# Patient Record
Sex: FEMALE | Race: WHITE | NOT HISPANIC OR LATINO | Employment: FULL TIME | URBAN - METROPOLITAN AREA
[De-identification: names, ages, dates, MRNs, and addresses within clinical notes are randomized per-mention and may not be internally consistent; named-entity substitution may affect disease eponyms.]

---

## 2018-03-09 ENCOUNTER — OFFICE VISIT CONVERTED (OUTPATIENT)
Dept: FAMILY MEDICINE CLINIC | Facility: CLINIC | Age: 43
End: 2018-03-09
Attending: NURSE PRACTITIONER

## 2019-12-12 ENCOUNTER — OFFICE VISIT CONVERTED (OUTPATIENT)
Dept: FAMILY MEDICINE CLINIC | Facility: CLINIC | Age: 44
End: 2019-12-12
Attending: NURSE PRACTITIONER

## 2019-12-12 ENCOUNTER — HOSPITAL ENCOUNTER (OUTPATIENT)
Dept: LAB | Facility: HOSPITAL | Age: 44
Discharge: HOME | End: 2019-12-12
Attending: NURSE PRACTITIONER

## 2019-12-12 LAB
25(OH)D3 SERPL-MCNC: 24.3 NG/ML (ref 30–100)
ALBUMIN SERPL-MCNC: 4 G/DL (ref 3.5–5)
ALBUMIN/GLOB SERPL: 1.1 {RATIO} (ref 1.4–2.6)
ALP SERPL-CCNC: 86 U/L (ref 42–98)
ALT SERPL-CCNC: 14 U/L (ref 10–40)
ANION GAP SERPL CALC-SCNC: 16 MMOL/L (ref 8–19)
AST SERPL-CCNC: 14 U/L (ref 15–50)
BASOPHILS # BLD AUTO: 0.07 10*3/UL (ref 0–0.2)
BASOPHILS NFR BLD AUTO: 0.8 % (ref 0–3)
BILIRUB SERPL-MCNC: 0.32 MG/DL (ref 0.2–1.3)
BUN SERPL-MCNC: 10 MG/DL (ref 5–25)
BUN/CREAT SERPL: 12 {RATIO} (ref 6–20)
CALCIUM SERPL-MCNC: 9.3 MG/DL (ref 8.7–10.4)
CHLORIDE SERPL-SCNC: 101 MMOL/L (ref 99–111)
CHOLEST SERPL-MCNC: 163 MG/DL (ref 107–200)
CHOLEST/HDLC SERPL: 3.6 {RATIO} (ref 3–6)
CONV ABS IMM GRAN: 0.03 10*3/UL (ref 0–0.2)
CONV CO2: 23 MMOL/L (ref 22–32)
CONV IMMATURE GRAN: 0.3 % (ref 0–1.8)
CONV TOTAL PROTEIN: 7.6 G/DL (ref 6.3–8.2)
CREAT UR-MCNC: 0.81 MG/DL (ref 0.5–0.9)
DEPRECATED RDW RBC AUTO: 44.8 FL (ref 36.4–46.3)
EOSINOPHIL # BLD AUTO: 0.18 10*3/UL (ref 0–0.7)
EOSINOPHIL # BLD AUTO: 2 % (ref 0–7)
ERYTHROCYTE [DISTWIDTH] IN BLOOD BY AUTOMATED COUNT: 13.1 % (ref 11.7–14.4)
GFR SERPLBLD BASED ON 1.73 SQ M-ARVRAT: >60 ML/MIN/{1.73_M2}
GLOBULIN UR ELPH-MCNC: 3.6 G/DL (ref 2–3.5)
GLUCOSE SERPL-MCNC: 102 MG/DL (ref 65–99)
HCT VFR BLD AUTO: 42.2 % (ref 37–47)
HDLC SERPL-MCNC: 45 MG/DL (ref 40–60)
HGB BLD-MCNC: 13.2 G/DL (ref 12–16)
LDLC SERPL CALC-MCNC: 89 MG/DL (ref 70–100)
LYMPHOCYTES # BLD AUTO: 2.26 10*3/UL (ref 1–5)
LYMPHOCYTES NFR BLD AUTO: 24.8 % (ref 20–45)
MCH RBC QN AUTO: 29.4 PG (ref 27–31)
MCHC RBC AUTO-ENTMCNC: 31.3 G/DL (ref 33–37)
MCV RBC AUTO: 94 FL (ref 81–99)
MONOCYTES # BLD AUTO: 0.5 10*3/UL (ref 0.2–1.2)
MONOCYTES NFR BLD AUTO: 5.5 % (ref 3–10)
NEUTROPHILS # BLD AUTO: 6.09 10*3/UL (ref 2–8)
NEUTROPHILS NFR BLD AUTO: 66.6 % (ref 30–85)
NRBC CBCN: 0 % (ref 0–0.7)
OSMOLALITY SERPL CALC.SUM OF ELEC: 281 MOSM/KG (ref 273–304)
PLATELET # BLD AUTO: 296 10*3/UL (ref 130–400)
PMV BLD AUTO: 11 FL (ref 9.4–12.3)
POTASSIUM SERPL-SCNC: 4 MMOL/L (ref 3.5–5.3)
RBC # BLD AUTO: 4.49 10*6/UL (ref 4.2–5.4)
SODIUM SERPL-SCNC: 136 MMOL/L (ref 135–147)
T4 FREE SERPL-MCNC: 1.1 NG/DL (ref 0.9–1.8)
TRIGL SERPL-MCNC: 147 MG/DL (ref 40–150)
TSH SERPL-ACNC: 2.07 M[IU]/L (ref 0.27–4.2)
VLDLC SERPL-MCNC: 29 MG/DL (ref 5–37)
WBC # BLD AUTO: 9.13 10*3/UL (ref 4.8–10.8)

## 2020-01-17 ENCOUNTER — HOSPITAL ENCOUNTER (OUTPATIENT)
Dept: GENERAL RADIOLOGY | Facility: HOSPITAL | Age: 45
Discharge: HOME | End: 2020-01-17
Attending: NURSE PRACTITIONER

## 2020-10-27 ENCOUNTER — OFFICE VISIT CONVERTED (OUTPATIENT)
Dept: FAMILY MEDICINE CLINIC | Facility: CLINIC | Age: 45
End: 2020-10-27
Attending: NURSE PRACTITIONER

## 2020-10-27 ENCOUNTER — HOSPITAL ENCOUNTER (OUTPATIENT)
Dept: FAMILY MEDICINE CLINIC | Facility: CLINIC | Age: 45
Discharge: HOME | End: 2020-10-27
Attending: NURSE PRACTITIONER

## 2020-10-30 LAB
AMPICILLIN SUSC ISLT: >=32
AMPICILLIN+SULBAC SUSC ISLT: >=32
BACTERIA UR CULT: ABNORMAL
CEFAZOLIN SUSC ISLT: <=4
CEFEPIME SUSC ISLT: <=0.12
CEFTAZIDIME SUSC ISLT: <=1
CEFTRIAXONE SUSC ISLT: <=0.25
CIPROFLOXACIN SUSC ISLT: <=0.25
ERTAPENEM SUSC ISLT: <=0.12
GENTAMICIN SUSC ISLT: <=1
LEVOFLOXACIN SUSC ISLT: <=0.12
NITROFURANTOIN SUSC ISLT: <=16
PIP+TAZO SUSC ISLT: <=4
TMP SMX SUSC ISLT: >=320
TOBRAMYCIN SUSC ISLT: <=1

## 2020-12-14 ENCOUNTER — HOSPITAL ENCOUNTER (OUTPATIENT)
Dept: FAMILY MEDICINE CLINIC | Facility: CLINIC | Age: 45
Discharge: HOME | End: 2020-12-14
Attending: NURSE PRACTITIONER

## 2020-12-14 ENCOUNTER — HOSPITAL ENCOUNTER (OUTPATIENT)
Dept: LAB | Facility: HOSPITAL | Age: 45
Discharge: HOME | End: 2020-12-14
Attending: NURSE PRACTITIONER

## 2020-12-14 ENCOUNTER — OFFICE VISIT CONVERTED (OUTPATIENT)
Dept: FAMILY MEDICINE CLINIC | Facility: CLINIC | Age: 45
End: 2020-12-14
Attending: NURSE PRACTITIONER

## 2020-12-14 LAB
ALBUMIN SERPL-MCNC: 3.9 G/DL (ref 3.5–5)
ALBUMIN/GLOB SERPL: 1.1 {RATIO} (ref 1.4–2.6)
ALP SERPL-CCNC: 84 U/L (ref 42–98)
ALT SERPL-CCNC: 13 U/L (ref 10–40)
ANION GAP SERPL CALC-SCNC: 14 MMOL/L (ref 8–19)
AST SERPL-CCNC: 17 U/L (ref 15–50)
BASOPHILS # BLD AUTO: 0.06 10*3/UL (ref 0–0.2)
BASOPHILS NFR BLD AUTO: 0.8 % (ref 0–3)
BILIRUB SERPL-MCNC: 0.34 MG/DL (ref 0.2–1.3)
BUN SERPL-MCNC: 10 MG/DL (ref 5–25)
BUN/CREAT SERPL: 12 {RATIO} (ref 6–20)
CALCIUM SERPL-MCNC: 9.3 MG/DL (ref 8.7–10.4)
CHLORIDE SERPL-SCNC: 104 MMOL/L (ref 99–111)
CHOLEST SERPL-MCNC: 162 MG/DL (ref 107–200)
CHOLEST/HDLC SERPL: 3.4 {RATIO} (ref 3–6)
CONV ABS IMM GRAN: 0.02 10*3/UL (ref 0–0.2)
CONV CO2: 24 MMOL/L (ref 22–32)
CONV IMMATURE GRAN: 0.3 % (ref 0–1.8)
CONV TOTAL PROTEIN: 7.3 G/DL (ref 6.3–8.2)
CREAT UR-MCNC: 0.83 MG/DL (ref 0.5–0.9)
DEPRECATED RDW RBC AUTO: 45.7 FL (ref 36.4–46.3)
EOSINOPHIL # BLD AUTO: 0.15 10*3/UL (ref 0–0.7)
EOSINOPHIL # BLD AUTO: 2.1 % (ref 0–7)
ERYTHROCYTE [DISTWIDTH] IN BLOOD BY AUTOMATED COUNT: 13.2 % (ref 11.7–14.4)
GFR SERPLBLD BASED ON 1.73 SQ M-ARVRAT: >60 ML/MIN/{1.73_M2}
GLOBULIN UR ELPH-MCNC: 3.4 G/DL (ref 2–3.5)
GLUCOSE SERPL-MCNC: 99 MG/DL (ref 65–99)
HCT VFR BLD AUTO: 42.1 % (ref 37–47)
HDLC SERPL-MCNC: 48 MG/DL (ref 40–60)
HGB BLD-MCNC: 13.1 G/DL (ref 12–16)
LDLC SERPL CALC-MCNC: 91 MG/DL (ref 70–100)
LYMPHOCYTES # BLD AUTO: 1.97 10*3/UL (ref 1–5)
LYMPHOCYTES NFR BLD AUTO: 27.3 % (ref 20–45)
MCH RBC QN AUTO: 29.2 PG (ref 27–31)
MCHC RBC AUTO-ENTMCNC: 31.1 G/DL (ref 33–37)
MCV RBC AUTO: 94 FL (ref 81–99)
MONOCYTES # BLD AUTO: 0.47 10*3/UL (ref 0.2–1.2)
MONOCYTES NFR BLD AUTO: 6.5 % (ref 3–10)
NEUTROPHILS # BLD AUTO: 4.55 10*3/UL (ref 2–8)
NEUTROPHILS NFR BLD AUTO: 63 % (ref 30–85)
NRBC CBCN: 0 % (ref 0–0.7)
OSMOLALITY SERPL CALC.SUM OF ELEC: 285 MOSM/KG (ref 273–304)
PLATELET # BLD AUTO: 311 10*3/UL (ref 130–400)
PMV BLD AUTO: 11.3 FL (ref 9.4–12.3)
POTASSIUM SERPL-SCNC: 4.1 MMOL/L (ref 3.5–5.3)
RBC # BLD AUTO: 4.48 10*6/UL (ref 4.2–5.4)
SODIUM SERPL-SCNC: 138 MMOL/L (ref 135–147)
T4 FREE SERPL-MCNC: 1.1 NG/DL (ref 0.9–1.8)
TRIGL SERPL-MCNC: 115 MG/DL (ref 40–150)
TSH SERPL-ACNC: 1.75 M[IU]/L (ref 0.27–4.2)
VLDLC SERPL-MCNC: 23 MG/DL (ref 5–37)
WBC # BLD AUTO: 7.22 10*3/UL (ref 4.8–10.8)

## 2020-12-16 LAB — BACTERIA UR CULT: NORMAL

## 2020-12-17 LAB
CONV LAST MENSTURAL PERIOD: NORMAL
SPECIMEN SOURCE: NORMAL
SPECIMEN SOURCE: NORMAL
THIN PREP CVX: NORMAL

## 2021-03-23 ENCOUNTER — HOSPITAL ENCOUNTER (OUTPATIENT)
Dept: VACCINE CLINIC | Facility: HOSPITAL | Age: 46
Discharge: HOME | End: 2021-03-23
Attending: INTERNAL MEDICINE

## 2021-04-13 ENCOUNTER — HOSPITAL ENCOUNTER (OUTPATIENT)
Dept: VACCINE CLINIC | Facility: HOSPITAL | Age: 46
Discharge: HOME | End: 2021-04-13
Attending: INTERNAL MEDICINE

## 2021-04-26 ENCOUNTER — HOSPITAL ENCOUNTER (OUTPATIENT)
Dept: GENERAL RADIOLOGY | Facility: HOSPITAL | Age: 46
Discharge: HOME | End: 2021-04-26
Attending: NURSE PRACTITIONER

## 2021-05-13 NOTE — PROGRESS NOTES
"   Progress Note      Patient Name: Shayna Abreu   Patient ID: [id number]   Sex: Female   YOB: 1975    Primary Care Provider: Jenni PATINO   Referring Provider: Jenni PATINO    Visit Date: 2020    Provider: SUKUMAR Steele   Location: [facility]   Location Address: [address] [place] [address], [address]  [place]  [id number]   Location Phone: [phone]          Chief Complaint  · Annual Exam  · PAP exam  · (Health Maintainence Information Reviewed Under Results)      History Of Present Illness  Last PAP Smear: .   Date of Last Mammogram: 2020.   Date of Last Colonoscopy: N/A   No current complaints.   Shayna Abreu is a 44 year old /White female who presents for evaluation and treatment of:      Pt is here for a pap. LMP was 12 years ago. Pt had an ablation. Pt states she will occasionally get a \"sympathy period\" with her daughter. Pt had large amout of blood in her urine. Pt will return in one month for repeat UA. If abnormal, will refer to Urology.    Pt is due labs.     Pt will be due mammogram after 2021.       Past Medical History  Disease Name Date Onset Notes   Pap smear for cervical cancer screening 19 normal.    Plantar fasciitis --  --    Vitamin D Deficiency --  --          Past Surgical History  Procedure Name Date Notes   Ablation  Dr. Davis   Cholecystectomy  --    Jaw Surgery  TMJ   Tubal ligation --  --          Allergy List  Allergen Name Date Reaction Notes   PENICILLINS --  --  --        Allergies Reconciled  Family Medical History  Disease Name Relative/Age Notes   Lung Neoplasm, Malignant Uncle/65   --    Diabetes, unspecified type Mother/   --    Aneurysm Uncle/   --          Reproductive History  Menstrual   Age Menarche: 14   Pregnancy Summary   Total Pregnancies: 1 Full Term: 1 Premature: 0   Ab Induced: 0 Ab Spontaneous: 0 Ectopics: 0   Multiples: 0 Livin " "        Social History  Finding Status Start/Stop Quantity Notes   Active but no formal exercise --  --/-- --  --    Alcohol Light --/-- --  --     --  --/-- --  --    No known infection risk --  --/-- --  --    Tobacco Former --/-- --  03/09/2018 - former Quit 11/1/2014         Immunizations  NameDate Admin Mfg Trade Name Lot Number Route Inj VIS Given VIS Publication   Maycykaul66/27/2020 [facility] Fluzone Quadrivalent [id number] IM RD 10/27/2020 08/15/2019   Comments: Pt tolerated inj well   Tdap10/26/2016 [facility] BOOSTRIX GS22H IM LD 10/26/2016 01/24/2012   Comments: Injection given. Pt tolerated well.         Review of Systems  · Constitutional  o Denies  o : appetite change, fatigue, night sweats, weight gain, weight loss  · HENT  o Denies  o : hearing loss, tinnitus, vertigo, nasal discharge, nose bleeding, dental problems, oral lesions, sore throat  · Breasts  o Denies  o : lumps, tenderness, swelling, nipple discharge  · Cardiovascular  o Denies  o : chest pain, decreased exercise tolerance, dyspnea on exertion, palpitations  · Respiratory  o Denies  o : cough, shortness of breath, wheezing, snoring, apneas  · Gastrointestinal  o Denies  o : abdominal pain, nausea, vomiting, dysphagia, heartburn, changes in bowel habits, constipation, diarrhea  · Genitourinary  o Denies  o : dysuria, hematuria, incontinence, nocturia, frequency, urgency, sexual dysfunction  · Neurologic  o Denies  o : abnormal gait, facial weakness, headache, memory difficulties, tingling or numbness, seizures, tremors  · Psychiatric  o Denies  o : anxiety, decreased concentration, irritability, panic attacks, sleep distrubances, sadness/tearfulness      Vitals  Date Time BP Position Site L\R Cuff Size HR RR TEMP (F) WT  HT  BMI kg/m2 BSA m2 O2 Sat FR L/min FiO2        12/12/2019 07:55 /79 Sitting    72 - R   251lbs 2oz 5'  8\" 38.18 2.34 100 %      10/27/2020 01:53 /55 Sitting    87 - R   266lbs 2oz 5'  8\" 40.46 2.41 99 %  21%  " "  12/14/2020 08:44 /54 Sitting    58 - R   262lbs 2oz 5'  8\" 39.86 2.39 98 %  21%          Physical Examination  · Constitutional  o Appearance  o : well-nourished, in no acute distress  · Ears, Nose, Mouth and Throat  o Ears  o :   § External Ears  § : appearance within normal limits, no lesions present  § Otoscopic Examination  § : tympanic membrane appearance within normal limits bilaterally without perforations, mobility normal  o Nose  o :   § External Nose  § : normal stucture noted.  § Intranasal Exam  § : no swelling, reddness, turbs normal lesley.  o Oral Cavity  o :   § Oral Mucosa  § : oral mucosa normal without pallor or cyanosis  § Lips  § : lip appearance normal  § Teeth  § : normal dentition for age  § Gums  § : gums pink, non-swollen, no bleeding present  § Tongue  § : tongue appearance normal  § Palate  § : hard palate normal, soft palate appearance normal  o Throat  o :   § Oropharynx  § : no inflammation or lesions present, tonsils within normal limits  · Respiratory  o Respiratory Effort  o : breathing unlabored  o Inspection of Chest  o : normal appearance  o Auscultation of Lungs  o : normal breath sounds throughout  · Cardiovascular  o Heart  o :   § Auscultation of Heart  § : regular rate and rhythm, no murmurs, gallops or rubs  § Palpation of Heart  § : normal apical impulse, no cardiac thrill present  o Peripheral Vascular System  o :   § Extremities  § : no cyanosis, clubbing or edema; less than 2 second refill noted  · Breasts  o Inspection of Breasts  o : breasts symmetrical, no skin changes, no deformities present, no discharge present  o Palpation of Breasts, Axillae  o : no masses present on palpation, no breast tenderness  · Gastrointestinal  o Abdominal Examination  o : abdomen nontender to palpation, tone normal without rigidity or guarding, no masses present, normal bowel sounds  o Liver and spleen  o : no hepatomegaly present, liver nontender to palpation, spleen not " palpable  · Genitourinary  o External Genitalia  o : no inflammation, no lesions present  o Vagina  o : normal vaginal vault, no discharge present, no inflammatory lesions present, no masses present  o Bladder  o : nontender to palpation  o Cervix  o : appearance healthy, no lesions present, nontender to palpation, no discharges, no bleeding present, normal midline position  o Uterus  o : nontender to palpation, no masses present, position midline/midplane  o Adnexa  o : no tenderness or masses present on bimanual examination  o Anus  o : no inflammation or lesions present  o Perineum  o : perineum within normal limits  · Lymphatic  o Neck  o : no lymphadenopathy present  o Axilla  o : no lymphadenopathy present  o Groin  o : no lymphadenopathy present  · Musculoskeletal  o Right Upper Extremity  o :   § Inspection/Palpation  § : no tenderness to palpation  § Range of Motion  § : range of motion normal, no joint crepitus or pain with motion present  o Left Upper Extremity  o :   § Inspection/Palpation  § : no tenderness to palpation  § Range of Motion  § : range of motion normal, no joint crepitus present, no pain with joint motion  o Right Lower Extremity  o :   § Inspection/Palpation  § : no joint or limb tenderness to palpation  § Range of Motion  § : range of motion normal, no joint crepitations present, no pain on motion  o Left Lower Extremity  o :   § Inspection/Palpation  § : no joint or limb tenderness to palpation  § Range of Motion  § : range of motion normal, no joint crepitations present, no pain on motion  · Skin and Subcutaneous Tissue  o General Inspection  o : no rashes or lesions present, no areas of discoloration  · Neurologic  o Mental Status Examination  o :   § Orientation  § : grossly oriented to person, place and time  o Cranial Nerves  o : cranial nerves intact and symmetric throughout  o Gait and Station  o : normal gait, able to stand without difficulty  · Psychiatric  o Judgment and  Insight  o : judgment and insight intact  o Mood and Affect  o : mood normal, affect appropriate          Results  · In-Office Procedures  o Lab procedure  § IOP - Urinalysis without Microscopy (Clinitek) [facility] ([id number])   § Color Ur: Yellow   § Clarity Ur: Clear   § Glucose Ur Ql Strip: Negative   § Bilirub Ur Ql Strip: Negative   § Ketones Ur Ql Strip: Negative   § Sp Gr Ur Qn: 1.030   § Hgb Ur Ql Strip: Large   § pH Ur-LsCnc: 5.0   § Prot Ur Ql Strip: Negative   § Urobilinogen Ur Strip-mCnc: 0.2 E.U./dL   § Nitrite Ur Ql Strip: Negative   § WBC Est Ur Ql Strip: Negative       Assessment  · Routine gynecological examination     V72.31/Z01.419  · Pap smear, as part of routine gynecological examination     V76.2/Z01.419  · Screening for lipid disorders     V77.91/Z13.220  · Visit for screening mammogram     V76.12/Z12.31  · Hematuria     599.70/R31.9  · Screening for thyroid disorder     V77.0/Z13.29      Plan  · Orders  o Pap smear (42949) - V76.2/Z01.419 - 12/14/2020  o Lipid Panel [facility] (47857) - V77.91/Z13.220 - 12/14/2020  o Screening Mammography; Bilateral 3D (72979, , 95670) - V76.12/Z12.31 - 12/14/2020  o CBC with Auto Diff [facility] (89861) - - 12/14/2020  o CMP [facility] (57886) - - 12/14/2020  o Thyroid Profile (68905, 74843, THYII) - - 12/14/2020  o ACO-39: Current medications updated and reviewed (1159F, ) - - 12/14/2020  o Urine Culture (Clean Catch) [facility] (91356) - - 12/14/2020  · Medications  o Macrobid 100 mg oral capsule   SIG: take 1 capsule (100 mg) by oral route every 12 hours with food for 7 days   DISP: (14) Capsule with 0 refills  Discontinued on 12/14/2020     o Pyridium 100 mg oral tablet   SIG: take 1 tablet (100 mg) by oral route 3 times per day after meals prn for dysuria   DISP: (9) Tablet with 0 refills  Discontinued on 12/14/2020     o Medications have been Reconciled  o Transition of Care or Provider Policy  · Instructions  o **Pap Test/Liquid Based:   o Thin  Prep  o Source:  o Cervix  o ********  o **Perform Reflex Human Papilloma Virus (HPV) High Risk on this Pap (If atypical squamous cells of the undetermined signifigcance (ASCUS)/Atypical Glandular Cells of undetermined significance (AGCUS): Low Grade Squamous Intraepitheal lesion (LGSIL): **  o Yes, if abnormal   o No  o ********  o Medicare:  o No  o **Is this an annual PAP:  o Yes  o Counseled on monthly breast self exams.   o Counseled on diet and exercise.   o Counseled on weight-bearing exercise.  o Used cytobrush to obtain Pap smear specimen. Sent to pathology for testing and review.  o Patient was educated/instructed on their diagnosis, treatment and medications prior to discharge from the clinic today.  o Patient instructed to seek medical attention urgently for new or worsening symptoms.  o Call the office with any concerns or questions.  o repeat UA in one month if still positive for blood refer to Urology  · Disposition  o Call or Return if symptoms worsen or persist.  o Return Visit Request in/on 1 year +/- 2 weeks (67917).            Electronically Signed by: SUKUMAR Steele -Author on December 14, 2020 09:46:42 AM

## 2021-05-13 NOTE — PROGRESS NOTES
"   Progress Note      Patient Name: Shayna Abreu   Patient ID: [id number]   Sex: Female   YOB: 1975    Primary Care Provider: Jenni PATINO   Referring Provider: Jenni PATINO    Visit Date: 2020    Provider: SUKUMAR Steele   Location: [facility]   Location Address: [address] [place] [address], [address]  [place], [place]  [id number]   Location Phone: [phone]          Chief Complaint  · possible UTI      History Of Present Illness  Shayna Abreu is a 44 year old /White female who presents for evaluation and treatment of:      Pt has c/o possible UTI. Pt states she is having stabbing pains in her back that radiate to the left side/hip. , pt states urine was red in color. Pt thought she was having a \"menstrual cycle.\" Pt has had a thermal ablation and no longer has a menstrual cycle. Pt states she has urinary urgency and frequency but can barely go. Pt has tried taking ibuprofen, aleve, sleeping with a heating pad, with no relief.       Past Medical History  Disease Name Date Onset Notes   Pap smear for cervical cancer screening 19 normal.    Plantar fasciitis --  --    Vitamin D Deficiency --  --          Past Surgical History  Procedure Name Date Notes   Ablation  Dr. Davis   Cholecystectomy  --    Jaw Surgery  TMJ   Tubal ligation --  --          Allergy List  Allergen Name Date Reaction Notes   PENICILLINS --  --  --          Family Medical History  Disease Name Relative/Age Notes   Lung Neoplasm, Malignant Uncle/65   --    Diabetes, unspecified type Mother/   --    Aneurysm Uncle/   --          Reproductive History  Menstrual   Age Menarche: 14   Pregnancy Summary   Total Pregnancies: 1 Full Term: 1 Premature: 0   Ab Induced: 0 Ab Spontaneous: 0 Ectopics: 0   Multiples: 0 Livin         Social History  Finding Status Start/Stop Quantity Notes   Active but no formal exercise --  --/-- --  --    Alcohol Light --/-- " "--  --     --  --/-- --  --    No known infection risk --  --/-- --  --    Tobacco Former --/-- --  03/09/2018 - former Quit 11/1/2014         Immunizations  NameDate Admin Mfg Trade Name Lot Number Route Inj VIS Given VIS Publication   Rxuiowejl03/27/2020 [facility] Fluzone Quadrivalent [id number] IM RD 10/27/2020 08/15/2019   Comments: Pt tolerated inj well   Tdap10/26/2016 [facility] BOOSTRIX GS22H IM LD 10/26/2016 01/24/2012   Comments: Injection given. Pt tolerated well.         Review of Systems  · Constitutional  o Denies  o : fever, fatigue, weight loss, weight gain  · Cardiovascular  o Denies  o : lower extremity edema, claudication, chest pressure, palpitations  · Respiratory  o Denies  o : shortness of breath, wheezing, cough, hemoptysis, dyspnea on exertion  · Gastrointestinal  o Denies  o : nausea, vomiting, diarrhea, constipation, abdominal pain  · Genitourinary  o Admits  o : urgency, frequency, dysuria, hematuria      Vitals  Date Time BP Position Site L\R Cuff Size HR RR TEMP (F) WT  HT  BMI kg/m2 BSA m2 O2 Sat FR L/min FiO2 HC       03/09/2018 09:44 /78 Sitting    64 - R 18 97 260lbs 0oz 5'  8\" 39.53 2.38 99 %      12/12/2019 07:55 /79 Sitting    72 - R   251lbs 2oz 5'  8\" 38.18 2.34 100 %      10/27/2020 01:53 /55 Sitting    87 - R   266lbs 2oz 5'  8\" 40.46 2.41 99 %  21%          Physical Examination  · Constitutional  o Appearance  o : well-nourished, well developed, alert, in no acute distress  · Respiratory  o Respiratory Effort  o : breathing unlabored  o Auscultation of Lungs  o : normal breath sounds throughout  · Cardiovascular  o Heart  o :   § Auscultation of Heart  § : regular rate and rhythm, no murmurs, gallops or rubs  § Palpation of Heart  § : normal apical impulse, no cardiac thrill present  o Peripheral Vascular System  o :   § Extremities  § : no cyanosis, clubbing or edema; less than 2 second refill noted  · Gastrointestinal  o Abdominal Examination  o : abdomen " nontender to palpation, tone normal without rigidity or guarding, no masses present, abdominal contour scaphoid  o Liver and spleen  o : no hepatomegaly present, liver nontender to palpation, spleen not palpable  · Genitourinary  o FEMALE  EXAM:  o :   §  and rectal exam deferred  § : negative CVA tenderness  · Skin and Subcutaneous Tissue  o General Inspection  o : no rashes or lesions present, no areas of discoloration  · Neurologic  o Mental Status Examination  o :   § Orientation  § : grossly oriented to person, place and time  o Cranial Nerves  o : cranial nerves intact and symmetric throughout  · Psychiatric  o Mood and Affect  o : mood normal, affect appropriate          Results  · In-Office Procedures  o Lab procedure  § IOP - Urinalysis without Microscopy (Clinitek) [facility] (66701)   § Color Ur: Yellow   § Clarity Ur: Cloudy   § Glucose Ur Ql Strip: Negative   § Bilirub Ur Ql Strip: Negative   § Ketones Ur Ql Strip: Negative   § Sp Gr Ur Qn: 1.030   § Hgb Ur Ql Strip: Large   § pH Ur-LsCnc: 5.5   § Prot Ur Ql Strip: 100 mg/dL   § Urobilinogen Ur Strip-mCnc: 0.2 E.U./dL   § Nitrite Ur Ql Strip: Negative   § WBC Est Ur Ql Strip: Moderate       Assessment  · Urinary tract infection     599.0/N39.0  · Need for influenza vaccination     V04.81/Z23  · Urinary frequency     788.41/R35.0  · Urinary urgency     788.63/R39.15  · Malodorous urine     791.9/R82.90      Plan  · Orders  o Urine Culture (Clean Catch) (04835, 06927) - 599.0/N39.0 - 10/27/2020  o Immunization Admin Fee (Single) ([facility]) (26571) - V04.81/Z23 - 10/27/2020  o Fluzone Quadrivalent Vaccine, age 6 months + (90249) - V04.81/Z23 - 10/27/2020   Vaccine - Influenza; Dose: 0.5; Site: Right Deltoid; Route: Intramuscular; Date: 10/27/2020 14:16:00; Exp: 06/30/2021; Lot: [id number]; Mfg: sanofi pasteur; TradeName: Fluzone Quadrivalent; Administered By: Lashay Roberts MA; Comment: Pt tolerated inj well  o ACO-39: Current medications updated and reviewed  (1159F, ) - - 10/27/2020  o ACO-14: Influenza immunization administered or previously received [facility] () - - 10/27/2020  · Medications  o Macrobid 100 mg oral capsule   SIG: take 1 capsule (100 mg) by oral route every 12 hours with food for 7 days   DISP: (14) Capsule with 0 refills  Prescribed on 10/27/2020     o Pyridium 100 mg oral tablet   SIG: take 1 tablet (100 mg) by oral route 3 times per day after meals prn for dysuria   DISP: (9) Tablet with 0 refills  Prescribed on 10/27/2020     o Medications have been Reconciled  o Transition of Care or Provider Policy  · Instructions  o Increase fluid intake. If you develop fever, chills, N/V, flank pain, or worsening of your symptoms return to the office or go to the ER.  o Patient was educated/instructed on their diagnosis, treatment and medications prior to discharge from the clinic today.  o Patient instructed to seek medical attention urgently for new or worsening symptoms.  o Call the office with any concerns or questions.  · Disposition  o Call or Return if symptoms worsen or persist.            Electronically Signed by: SUKUMAR Steele -Author on October 27, 2020 02:43:57 PM

## 2021-05-14 VITALS
HEIGHT: 68 IN | SYSTOLIC BLOOD PRESSURE: 133 MMHG | BODY MASS INDEX: 40.33 KG/M2 | DIASTOLIC BLOOD PRESSURE: 55 MMHG | OXYGEN SATURATION: 99 % | WEIGHT: 266.12 LBS | HEART RATE: 87 BPM

## 2021-05-14 VITALS
WEIGHT: 262.12 LBS | SYSTOLIC BLOOD PRESSURE: 121 MMHG | OXYGEN SATURATION: 98 % | HEIGHT: 68 IN | DIASTOLIC BLOOD PRESSURE: 54 MMHG | BODY MASS INDEX: 39.73 KG/M2 | HEART RATE: 58 BPM

## 2021-05-15 VITALS
BODY MASS INDEX: 38.06 KG/M2 | DIASTOLIC BLOOD PRESSURE: 79 MMHG | SYSTOLIC BLOOD PRESSURE: 118 MMHG | HEART RATE: 72 BPM | HEIGHT: 68 IN | OXYGEN SATURATION: 100 % | WEIGHT: 251.12 LBS

## 2021-05-16 VITALS
RESPIRATION RATE: 18 BRPM | HEART RATE: 64 BPM | OXYGEN SATURATION: 99 % | DIASTOLIC BLOOD PRESSURE: 78 MMHG | SYSTOLIC BLOOD PRESSURE: 128 MMHG | WEIGHT: 260 LBS | BODY MASS INDEX: 39.4 KG/M2 | TEMPERATURE: 97 F | HEIGHT: 68 IN

## 2021-12-06 ENCOUNTER — LAB (OUTPATIENT)
Dept: LAB | Facility: HOSPITAL | Age: 46
End: 2021-12-06

## 2021-12-06 ENCOUNTER — OFFICE VISIT (OUTPATIENT)
Dept: FAMILY MEDICINE CLINIC | Facility: CLINIC | Age: 46
End: 2021-12-06

## 2021-12-06 VITALS
HEIGHT: 68 IN | OXYGEN SATURATION: 97 % | HEART RATE: 63 BPM | SYSTOLIC BLOOD PRESSURE: 128 MMHG | BODY MASS INDEX: 39.86 KG/M2 | DIASTOLIC BLOOD PRESSURE: 62 MMHG | WEIGHT: 263 LBS

## 2021-12-06 DIAGNOSIS — Z00.00 ANNUAL PHYSICAL EXAM: Primary | ICD-10-CM

## 2021-12-06 DIAGNOSIS — Z23 NEED FOR INFLUENZA VACCINATION: ICD-10-CM

## 2021-12-06 DIAGNOSIS — Z00.00 ANNUAL PHYSICAL EXAM: ICD-10-CM

## 2021-12-06 PROBLEM — E55.9 VITAMIN D DEFICIENCY: Status: ACTIVE | Noted: 2021-12-06

## 2021-12-06 PROBLEM — Z12.4 PAP SMEAR FOR CERVICAL CANCER SCREENING: Status: ACTIVE | Noted: 2017-12-04

## 2021-12-06 PROBLEM — M72.2 PLANTAR FASCIITIS: Status: ACTIVE | Noted: 2021-12-06

## 2021-12-06 LAB
ALBUMIN SERPL-MCNC: 4.1 G/DL (ref 3.5–5.2)
ALBUMIN/GLOB SERPL: 1.1 G/DL
ALP SERPL-CCNC: 86 U/L (ref 39–117)
ALT SERPL W P-5'-P-CCNC: 15 U/L (ref 1–33)
ANION GAP SERPL CALCULATED.3IONS-SCNC: 8.2 MMOL/L (ref 5–15)
AST SERPL-CCNC: 15 U/L (ref 1–32)
BASOPHILS # BLD AUTO: 0.1 10*3/MM3 (ref 0–0.2)
BASOPHILS NFR BLD AUTO: 1.1 % (ref 0–1.5)
BILIRUB SERPL-MCNC: 0.4 MG/DL (ref 0–1.2)
BUN SERPL-MCNC: 10 MG/DL (ref 6–20)
BUN/CREAT SERPL: 12.2 (ref 7–25)
CALCIUM SPEC-SCNC: 9.7 MG/DL (ref 8.6–10.5)
CHLORIDE SERPL-SCNC: 105 MMOL/L (ref 98–107)
CHOLEST SERPL-MCNC: 166 MG/DL (ref 0–200)
CO2 SERPL-SCNC: 25.8 MMOL/L (ref 22–29)
CREAT SERPL-MCNC: 0.82 MG/DL (ref 0.57–1)
DEPRECATED RDW RBC AUTO: 40.6 FL (ref 37–54)
EOSINOPHIL # BLD AUTO: 0.15 10*3/MM3 (ref 0–0.4)
EOSINOPHIL NFR BLD AUTO: 1.6 % (ref 0.3–6.2)
ERYTHROCYTE [DISTWIDTH] IN BLOOD BY AUTOMATED COUNT: 12.4 % (ref 12.3–15.4)
GFR SERPL CREATININE-BSD FRML MDRD: 76 ML/MIN/1.73
GLOBULIN UR ELPH-MCNC: 3.6 GM/DL
GLUCOSE SERPL-MCNC: 90 MG/DL (ref 65–99)
HCT VFR BLD AUTO: 39.1 % (ref 34–46.6)
HDLC SERPL-MCNC: 45 MG/DL (ref 40–60)
HGB BLD-MCNC: 13 G/DL (ref 12–15.9)
IMM GRANULOCYTES # BLD AUTO: 0.05 10*3/MM3 (ref 0–0.05)
IMM GRANULOCYTES NFR BLD AUTO: 0.5 % (ref 0–0.5)
LDLC SERPL CALC-MCNC: 98 MG/DL (ref 0–100)
LDLC/HDLC SERPL: 2.12 {RATIO}
LYMPHOCYTES # BLD AUTO: 2.25 10*3/MM3 (ref 0.7–3.1)
LYMPHOCYTES NFR BLD AUTO: 23.9 % (ref 19.6–45.3)
MCH RBC QN AUTO: 29.7 PG (ref 26.6–33)
MCHC RBC AUTO-ENTMCNC: 33.2 G/DL (ref 31.5–35.7)
MCV RBC AUTO: 89.3 FL (ref 79–97)
MONOCYTES # BLD AUTO: 0.49 10*3/MM3 (ref 0.1–0.9)
MONOCYTES NFR BLD AUTO: 5.2 % (ref 5–12)
NEUTROPHILS NFR BLD AUTO: 6.39 10*3/MM3 (ref 1.7–7)
NEUTROPHILS NFR BLD AUTO: 67.7 % (ref 42.7–76)
NRBC BLD AUTO-RTO: 0 /100 WBC (ref 0–0.2)
PLATELET # BLD AUTO: 310 10*3/MM3 (ref 140–450)
PMV BLD AUTO: 11.7 FL (ref 6–12)
POTASSIUM SERPL-SCNC: 4.5 MMOL/L (ref 3.5–5.2)
PROT SERPL-MCNC: 7.7 G/DL (ref 6–8.5)
RBC # BLD AUTO: 4.38 10*6/MM3 (ref 3.77–5.28)
SODIUM SERPL-SCNC: 139 MMOL/L (ref 136–145)
T4 FREE SERPL-MCNC: 1.13 NG/DL (ref 0.93–1.7)
TRIGL SERPL-MCNC: 127 MG/DL (ref 0–150)
TSH SERPL DL<=0.05 MIU/L-ACNC: 2.56 UIU/ML (ref 0.27–4.2)
VLDLC SERPL-MCNC: 23 MG/DL (ref 5–40)
WBC NRBC COR # BLD: 9.43 10*3/MM3 (ref 3.4–10.8)

## 2021-12-06 PROCEDURE — 80053 COMPREHEN METABOLIC PANEL: CPT

## 2021-12-06 PROCEDURE — 90471 IMMUNIZATION ADMIN: CPT | Performed by: NURSE PRACTITIONER

## 2021-12-06 PROCEDURE — 99396 PREV VISIT EST AGE 40-64: CPT | Performed by: NURSE PRACTITIONER

## 2021-12-06 PROCEDURE — 84439 ASSAY OF FREE THYROXINE: CPT

## 2021-12-06 PROCEDURE — 80061 LIPID PANEL: CPT

## 2021-12-06 PROCEDURE — 84443 ASSAY THYROID STIM HORMONE: CPT

## 2021-12-06 PROCEDURE — 85025 COMPLETE CBC W/AUTO DIFF WBC: CPT

## 2021-12-06 PROCEDURE — 36415 COLL VENOUS BLD VENIPUNCTURE: CPT

## 2021-12-06 PROCEDURE — 90686 IIV4 VACC NO PRSV 0.5 ML IM: CPT | Performed by: NURSE PRACTITIONER

## 2021-12-06 NOTE — PROGRESS NOTES
"Chief Complaint  Annual Exam    Subjective            Shayna Abreu presents to [facility]  Pt here for yearly phyiscal.    Pt is due labs.    Pt wants Flu vaccine.         PMH  Past Medical History:   Diagnosis Date   • Headache        ALLERGY  Allergies   Allergen Reactions   • Penicillins Hives        SURGICALHX  Past Surgical History:   Procedure Laterality Date   • CHOLECYSTECTOMY  removed approx 14 yrs ago   • TUBAL ABDOMINAL LIGATION  approx 15 yrs ago        SOCX  Social History     Tobacco Use   • Smoking status: Former Smoker     Packs/day: 1.00     Years: 15.00     Pack years: 15.00     Types: Cigarettes     Quit date: 2014     Years since quittin.1   • Smokeless tobacco: Never Used   Substance Use Topics   • Alcohol use: Not on file       FAMHX  History reviewed. No pertinent family history.     MEDSIGONLY  No current outpatient medications on file prior to visit.     No current facility-administered medications on file prior to visit.       Health Maintenance Due   Topic Date Due   • ANNUAL PHYSICAL  Never done   • INFLUENZA VACCINE  2021   • HEPATITIS C SCREENING  Never done   • PAP SMEAR  2021       Objective     /62   Pulse 63   Ht 172.7 cm (68\")   Wt 119 kg (263 lb)   SpO2 97%   BMI 39.99 kg/m²       Physical Exam  Constitutional:       General: She is not in acute distress.     Appearance: Normal appearance. She is not ill-appearing.   HENT:      Head: Normocephalic and atraumatic.      Right Ear: Tympanic membrane, ear canal and external ear normal.      Left Ear: Tympanic membrane, ear canal and external ear normal.      Nose: Nose normal.      Mouth/Throat:      Pharynx: No oropharyngeal exudate or posterior oropharyngeal erythema.   Eyes:      Extraocular Movements: Extraocular movements intact.      Conjunctiva/sclera: Conjunctivae normal.      Pupils: Pupils are equal, round, and reactive to light.   Cardiovascular:      Rate " and Rhythm: Normal rate and regular rhythm.      Pulses: Normal pulses.      Heart sounds: Normal heart sounds. No murmur heard.      Pulmonary:      Effort: Pulmonary effort is normal. No respiratory distress.      Breath sounds: Normal breath sounds.   Chest:      Chest wall: No tenderness.   Abdominal:      General: Abdomen is flat. Bowel sounds are normal. There is no distension.      Palpations: Abdomen is soft. There is no mass.      Tenderness: There is no abdominal tenderness. There is no guarding.   Musculoskeletal:         General: No swelling or tenderness. Normal range of motion.      Cervical back: Normal range of motion and neck supple.   Skin:     General: Skin is warm and dry.      Findings: No rash.   Neurological:      General: No focal deficit present.      Mental Status: She is alert and oriented to person, place, and time. Mental status is at baseline.      Gait: Gait normal.   Psychiatric:         Mood and Affect: Mood normal.         Behavior: Behavior normal.         Thought Content: Thought content normal.         Judgment: Judgment normal.           Result Review :                           Assessment and Plan        Diagnoses and all orders for this visit:    1. Annual physical exam (Primary)  -     CBC w AUTO Differential; Future  -     Comprehensive metabolic panel; Future  -     TSH; Future  -     T4, free; Future  -     Lipid panel; Future    2. Need for influenza vaccination  -     FluLaval/Fluarix/Fluzone >6 Months      Preventative Counseling:  Avoid alcohol and illegal drugs  Get adequate sleep  Healthy diet and daily exercise.        Follow Up     Return in about 1 year (around 12/6/2022) for Annual physical.    Patient was given instructions and counseling regarding her condition or for health maintenance advice. Please see specific information pulled into the AVS if appropriate.     Shayna Abreu  reports that she quit smoking about 7 years ago. Her smoking use included  cigarettes. She has a 15.00 pack-year smoking history. She has never used smokeless tobacco..

## 2021-12-07 ENCOUNTER — TELEPHONE (OUTPATIENT)
Dept: FAMILY MEDICINE CLINIC | Facility: CLINIC | Age: 46
End: 2021-12-07

## 2022-02-15 ENCOUNTER — TRANSCRIBE ORDERS (OUTPATIENT)
Dept: ADMINISTRATIVE | Facility: HOSPITAL | Age: 47
End: 2022-02-15

## 2022-02-15 DIAGNOSIS — Z12.31 VISIT FOR SCREENING MAMMOGRAM: Primary | ICD-10-CM

## 2022-05-03 ENCOUNTER — HOSPITAL ENCOUNTER (OUTPATIENT)
Dept: MAMMOGRAPHY | Facility: HOSPITAL | Age: 47
Discharge: HOME | End: 2022-05-03
Admitting: NURSE PRACTITIONER

## 2022-05-03 DIAGNOSIS — Z12.31 VISIT FOR SCREENING MAMMOGRAM: ICD-10-CM

## 2022-05-03 PROCEDURE — 77067 SCR MAMMO BI INCL CAD: CPT

## 2022-05-03 PROCEDURE — 77063 BREAST TOMOSYNTHESIS BI: CPT

## 2022-11-02 ENCOUNTER — OFFICE VISIT (OUTPATIENT)
Dept: FAMILY MEDICINE CLINIC | Facility: CLINIC | Age: 47
End: 2022-11-02

## 2022-11-02 VITALS
HEART RATE: 70 BPM | WEIGHT: 268 LBS | HEIGHT: 68 IN | OXYGEN SATURATION: 100 % | DIASTOLIC BLOOD PRESSURE: 66 MMHG | SYSTOLIC BLOOD PRESSURE: 124 MMHG | BODY MASS INDEX: 40.62 KG/M2

## 2022-11-02 DIAGNOSIS — Z13.220 SCREENING FOR CHOLESTEROL LEVEL: ICD-10-CM

## 2022-11-02 DIAGNOSIS — G43.801 OTHER MIGRAINE WITH STATUS MIGRAINOSUS, NOT INTRACTABLE: Primary | ICD-10-CM

## 2022-11-02 DIAGNOSIS — E66.01 MORBID (SEVERE) OBESITY DUE TO EXCESS CALORIES: ICD-10-CM

## 2022-11-02 DIAGNOSIS — Z00.00 ROUTINE MEDICAL EXAM: ICD-10-CM

## 2022-11-02 DIAGNOSIS — Z23 NEED FOR INFLUENZA VACCINATION: ICD-10-CM

## 2022-11-02 DIAGNOSIS — Z11.59 NEED FOR HEPATITIS C SCREENING TEST: ICD-10-CM

## 2022-11-02 DIAGNOSIS — Z13.29 SCREENING FOR THYROID DISORDER: ICD-10-CM

## 2022-11-02 PROCEDURE — 99214 OFFICE O/P EST MOD 30 MIN: CPT | Performed by: NURSE PRACTITIONER

## 2022-11-02 PROCEDURE — 80305 DRUG TEST PRSMV DIR OPT OBS: CPT | Performed by: NURSE PRACTITIONER

## 2022-11-02 PROCEDURE — 90686 IIV4 VACC NO PRSV 0.5 ML IM: CPT | Performed by: NURSE PRACTITIONER

## 2022-11-02 PROCEDURE — 90471 IMMUNIZATION ADMIN: CPT | Performed by: NURSE PRACTITIONER

## 2022-11-02 RX ORDER — PHENTERMINE HYDROCHLORIDE 37.5 MG/1
37.5 CAPSULE ORAL EVERY MORNING
Qty: 30 CAPSULE | Refills: 0 | Status: SHIPPED | OUTPATIENT
Start: 2022-11-02 | End: 2022-12-07 | Stop reason: SDUPTHER

## 2022-11-02 RX ORDER — RIMEGEPANT SULFATE 75 MG/75MG
75 TABLET, ORALLY DISINTEGRATING ORAL DAILY PRN
Qty: 16 TABLET | Refills: 1 | Status: SHIPPED | OUTPATIENT
Start: 2022-11-02

## 2022-11-02 NOTE — PROGRESS NOTES
"Chief Complaint  Migraine and Obesity (/)    Subjective            Shayna Abreu presents to [facility]  History of Present Illness  Pt is here to discuss starting a weight loss medication. Pt is open to options.  She has cheked her insurance and does not have prescription coverage for wt loss surgery or meds such as Saxenda or Wegovy.     Pt has c/o migraines. Pt has been keeping a journal. Pt has noticed she is getting them monthly and will last for 4 days. Pt states she will lose peripheral vision at times. At times pt will have spotty vision. Pt has been taking tylenol and ibuprofen with little relief. Pt has not been on a migraine medicine in the past.     Pt is due labs.        Past Medical History:   Diagnosis Date   • Headache        Allergies   Allergen Reactions   • Penicillins Hives        Past Surgical History:   Procedure Laterality Date   • CHOLECYSTECTOMY  removed approx 14 yrs ago   • TUBAL ABDOMINAL LIGATION  approx 15 yrs ago        Social History     Tobacco Use   • Smoking status: Former     Packs/day: 1.00     Years: 15.00     Pack years: 15.00     Types: Cigarettes     Quit date: 2014     Years since quittin.2   • Smokeless tobacco: Never   Substance Use Topics   • Alcohol use: Not on file       Family History   Problem Relation Age of Onset   • Asthma Mother    • COPD Mother    • Diabetes Mother    • Cancer Father         ESOPHAGUS & STOMACH   • Hearing loss Father    • Vision loss Father         No current outpatient medications on file prior to visit.     No current facility-administered medications on file prior to visit.       Health Maintenance Due   Topic Date Due   • COLORECTAL CANCER SCREENING  Never done   • COVID-19 Vaccine (3 - Booster for Pfizer series) 2021       Objective     /66   Pulse 70   Ht 172.7 cm (68\")   Wt 122 kg (268 lb)   SpO2 100%   BMI 40.75 kg/m²       Physical Exam  Constitutional:       General: She is " not in acute distress.     Appearance: Normal appearance. She is not ill-appearing.   HENT:      Head: Normocephalic and atraumatic.   Cardiovascular:      Rate and Rhythm: Normal rate and regular rhythm.      Heart sounds: Normal heart sounds. No murmur heard.  Pulmonary:      Effort: Pulmonary effort is normal. No respiratory distress.      Breath sounds: Normal breath sounds.   Chest:      Chest wall: No tenderness.   Abdominal:      General: Abdomen is flat. Bowel sounds are normal. There is no distension.      Palpations: Abdomen is soft. There is no mass.      Tenderness: There is no abdominal tenderness. There is no guarding.   Musculoskeletal:         General: No swelling or tenderness. Normal range of motion.      Cervical back: Normal range of motion and neck supple.   Skin:     General: Skin is warm and dry.      Findings: No rash.   Neurological:      General: No focal deficit present.      Mental Status: She is alert and oriented to person, place, and time. Mental status is at baseline.      Gait: Gait normal.   Psychiatric:         Mood and Affect: Mood normal.         Behavior: Behavior normal.         Thought Content: Thought content normal.         Judgment: Judgment normal.           Result Review :                           Assessment and Plan        Diagnoses and all orders for this visit:    1. Other migraine with status migrainosus, not intractable (Primary)  Comments:  pt to keep a HA diary on Nurtec and will reval in 1 month, may consider preventative therapy if not helping  Orders:  -     Rimegepant Sulfate (Nurtec) 75 MG tablet dispersible tablet; Take 1 tablet by mouth Daily As Needed (migraine).  Dispense: 16 tablet; Refill: 1  -     POC Urine Drug Screen [facility]    2. Body mass index (BMI) of 40.0 to 44.9 in adult (HCC)  -     CBC w AUTO Differential; Future  -     Comprehensive metabolic panel; Future  -     Discontinue: phentermine 37.5 MG capsule; Take 1 capsule by mouth Every  Morning.  Dispense: 30 capsule; Refill: 0  -     POC Urine Drug Screen [facility]    3. Need for influenza vaccination  -     FluLaval/Fluzone >6 mos (2554-4110)    4. Screening for cholesterol level  -     Lipid panel; Future    5. Screening for thyroid disorder  -     TSH; Future  -     T4, free; Future    6. Need for hepatitis C screening test  -     Hepatitis panel, acute; Future    7. Morbid (severe) obesity due to excess calories (HCC)  -     Discontinue: phentermine 37.5 MG capsule; Take 1 capsule by mouth Every Morning.  Dispense: 30 capsule; Refill: 0    8. Routine medical exam  -     CBC w AUTO Differential; Future  -     Comprehensive metabolic panel; Future  -     Lipid panel; Future  -     TSH; Future  -     T4, free; Future  -     Hepatitis panel, acute; Future              Follow Up     Return in about 1 month (around 12/2/2022) for Annual physical.    Patient was given instructions and counseling regarding her condition or for health maintenance advice. Please see specific information pulled into the AVS if appropriate.     Shayna Abreu  reports that she quit smoking about 8 years ago. Her smoking use included cigarettes. She has a 15.00 pack-year smoking history. She has never used smokeless tobacco..

## 2022-12-01 ENCOUNTER — LAB (OUTPATIENT)
Dept: LAB | Facility: HOSPITAL | Age: 47
End: 2022-12-01

## 2022-12-01 DIAGNOSIS — Z13.29 SCREENING FOR THYROID DISORDER: ICD-10-CM

## 2022-12-01 DIAGNOSIS — Z13.220 SCREENING FOR CHOLESTEROL LEVEL: ICD-10-CM

## 2022-12-01 DIAGNOSIS — Z11.59 NEED FOR HEPATITIS C SCREENING TEST: ICD-10-CM

## 2022-12-01 DIAGNOSIS — Z00.00 ROUTINE GENERAL MEDICAL EXAMINATION AT A HEALTH CARE FACILITY: Primary | ICD-10-CM

## 2022-12-01 LAB
ALBUMIN SERPL-MCNC: 4.4 G/DL (ref 3.5–5.2)
ALBUMIN/GLOB SERPL: 1.2 G/DL
ALP SERPL-CCNC: 92 U/L (ref 39–117)
ALT SERPL W P-5'-P-CCNC: 32 U/L (ref 1–33)
ANION GAP SERPL CALCULATED.3IONS-SCNC: 8.3 MMOL/L (ref 5–15)
AST SERPL-CCNC: 23 U/L (ref 1–32)
BASOPHILS # BLD AUTO: 0.06 10*3/MM3 (ref 0–0.2)
BASOPHILS NFR BLD AUTO: 0.7 % (ref 0–1.5)
BILIRUB SERPL-MCNC: 0.3 MG/DL (ref 0–1.2)
BUN SERPL-MCNC: 13 MG/DL (ref 6–20)
BUN/CREAT SERPL: 13 (ref 7–25)
CALCIUM SPEC-SCNC: 9.7 MG/DL (ref 8.6–10.5)
CHLORIDE SERPL-SCNC: 106 MMOL/L (ref 98–107)
CHOLEST SERPL-MCNC: 172 MG/DL (ref 0–200)
CO2 SERPL-SCNC: 24.7 MMOL/L (ref 22–29)
CREAT SERPL-MCNC: 1 MG/DL (ref 0.57–1)
DEPRECATED RDW RBC AUTO: 39.6 FL (ref 37–54)
EGFRCR SERPLBLD CKD-EPI 2021: 71.4 ML/MIN/1.73
EOSINOPHIL # BLD AUTO: 0.17 10*3/MM3 (ref 0–0.4)
EOSINOPHIL NFR BLD AUTO: 1.9 % (ref 0.3–6.2)
ERYTHROCYTE [DISTWIDTH] IN BLOOD BY AUTOMATED COUNT: 12.3 % (ref 12.3–15.4)
GLOBULIN UR ELPH-MCNC: 3.6 GM/DL
GLUCOSE SERPL-MCNC: 101 MG/DL (ref 65–99)
HAV IGM SERPL QL IA: NORMAL
HBV CORE IGM SERPL QL IA: NORMAL
HBV SURFACE AG SERPL QL IA: NORMAL
HCT VFR BLD AUTO: 41.8 % (ref 34–46.6)
HCV AB SER DONR QL: NORMAL
HDLC SERPL-MCNC: 43 MG/DL (ref 40–60)
HGB BLD-MCNC: 13.9 G/DL (ref 12–15.9)
IMM GRANULOCYTES # BLD AUTO: 0.03 10*3/MM3 (ref 0–0.05)
IMM GRANULOCYTES NFR BLD AUTO: 0.3 % (ref 0–0.5)
LDLC SERPL CALC-MCNC: 107 MG/DL (ref 0–100)
LDLC/HDLC SERPL: 2.43 {RATIO}
LYMPHOCYTES # BLD AUTO: 2.52 10*3/MM3 (ref 0.7–3.1)
LYMPHOCYTES NFR BLD AUTO: 28.3 % (ref 19.6–45.3)
MCH RBC QN AUTO: 29 PG (ref 26.6–33)
MCHC RBC AUTO-ENTMCNC: 33.3 G/DL (ref 31.5–35.7)
MCV RBC AUTO: 87.3 FL (ref 79–97)
MONOCYTES # BLD AUTO: 0.53 10*3/MM3 (ref 0.1–0.9)
MONOCYTES NFR BLD AUTO: 6 % (ref 5–12)
NEUTROPHILS NFR BLD AUTO: 5.58 10*3/MM3 (ref 1.7–7)
NEUTROPHILS NFR BLD AUTO: 62.8 % (ref 42.7–76)
NRBC BLD AUTO-RTO: 0 /100 WBC (ref 0–0.2)
PLATELET # BLD AUTO: 307 10*3/MM3 (ref 140–450)
PMV BLD AUTO: 11.4 FL (ref 6–12)
POTASSIUM SERPL-SCNC: 4.2 MMOL/L (ref 3.5–5.2)
PROT SERPL-MCNC: 8 G/DL (ref 6–8.5)
RBC # BLD AUTO: 4.79 10*6/MM3 (ref 3.77–5.28)
SODIUM SERPL-SCNC: 139 MMOL/L (ref 136–145)
T4 FREE SERPL-MCNC: 1.19 NG/DL (ref 0.93–1.7)
TRIGL SERPL-MCNC: 123 MG/DL (ref 0–150)
TSH SERPL DL<=0.05 MIU/L-ACNC: 2.68 UIU/ML (ref 0.27–4.2)
VLDLC SERPL-MCNC: 22 MG/DL (ref 5–40)
WBC NRBC COR # BLD: 8.89 10*3/MM3 (ref 3.4–10.8)

## 2022-12-01 PROCEDURE — 80061 LIPID PANEL: CPT

## 2022-12-01 PROCEDURE — 84439 ASSAY OF FREE THYROXINE: CPT

## 2022-12-01 PROCEDURE — 36415 COLL VENOUS BLD VENIPUNCTURE: CPT

## 2022-12-01 PROCEDURE — 80074 ACUTE HEPATITIS PANEL: CPT

## 2022-12-01 PROCEDURE — 80050 GENERAL HEALTH PANEL: CPT

## 2022-12-07 ENCOUNTER — OFFICE VISIT (OUTPATIENT)
Dept: FAMILY MEDICINE CLINIC | Facility: CLINIC | Age: 47
End: 2022-12-07

## 2022-12-07 VITALS
SYSTOLIC BLOOD PRESSURE: 120 MMHG | HEART RATE: 104 BPM | DIASTOLIC BLOOD PRESSURE: 62 MMHG | HEIGHT: 68 IN | BODY MASS INDEX: 39.25 KG/M2 | WEIGHT: 259 LBS | OXYGEN SATURATION: 94 %

## 2022-12-07 DIAGNOSIS — G43.101 MIGRAINE WITH AURA AND WITH STATUS MIGRAINOSUS, NOT INTRACTABLE: ICD-10-CM

## 2022-12-07 DIAGNOSIS — E66.01 MORBID (SEVERE) OBESITY DUE TO EXCESS CALORIES: ICD-10-CM

## 2022-12-07 DIAGNOSIS — Z12.11 SCREEN FOR COLON CANCER: ICD-10-CM

## 2022-12-07 DIAGNOSIS — Z00.00 ANNUAL PHYSICAL EXAM: Primary | ICD-10-CM

## 2022-12-07 PROCEDURE — 99396 PREV VISIT EST AGE 40-64: CPT | Performed by: NURSE PRACTITIONER

## 2022-12-07 RX ORDER — PHENTERMINE HYDROCHLORIDE 37.5 MG/1
37.5 CAPSULE ORAL EVERY MORNING
Qty: 30 CAPSULE | Refills: 0 | Status: SHIPPED | OUTPATIENT
Start: 2022-12-07 | End: 2023-01-09 | Stop reason: SDUPTHER

## 2022-12-07 NOTE — PROGRESS NOTES
"Chief Complaint  Annual Exam    Subjective            Shayna Abreu presents to [facility]  History of Present Illness  Pt is an annual CPE. Pt would like a refill on her phentermine. This is the second fill. Pt started at 268 lbs. Pt's weight today is 259lbs. Pt has lost a total 9 lbs so far. No issues or concerns at this time.    Pt is due pap and would like to be scheduled in office.     Pt had mammogram 22    Pt would like to get order placed for colonoscopy. Pt will be 47 in 1 wk.               Past Medical History:   Diagnosis Date   • Headache        Allergies   Allergen Reactions   • Penicillins Hives        Past Surgical History:   Procedure Laterality Date   • CHOLECYSTECTOMY  removed approx 14 yrs ago   • TUBAL ABDOMINAL LIGATION  approx 15 yrs ago        Social History     Tobacco Use   • Smoking status: Former     Packs/day: 1.00     Years: 15.00     Pack years: 15.00     Types: Cigarettes     Quit date: 2014     Years since quittin.1   • Smokeless tobacco: Never   Substance Use Topics   • Alcohol use: Not on file       History reviewed. No pertinent family history.     Current Outpatient Medications on File Prior to Visit   Medication Sig   • Rimegepant Sulfate (Nurtec) 75 MG tablet dispersible tablet Take 1 tablet by mouth Daily As Needed (migraine).   • [DISCONTINUED] phentermine 37.5 MG capsule Take 1 capsule by mouth Every Morning.     No current facility-administered medications on file prior to visit.       Health Maintenance Due   Topic Date Due   • PAP SMEAR  2021       Objective     /62   Pulse 104   Ht 172.7 cm (68\")   Wt 117 kg (259 lb)   SpO2 94%   BMI 39.38 kg/m²       Physical Exam  Constitutional:       General: She is not in acute distress.     Appearance: Normal appearance. She is not ill-appearing.   HENT:      Head: Normocephalic and atraumatic.      Right Ear: Tympanic membrane, ear canal and external ear normal.      " Left Ear: Tympanic membrane, ear canal and external ear normal.      Nose: Nose normal.   Cardiovascular:      Rate and Rhythm: Normal rate and regular rhythm.      Heart sounds: Normal heart sounds. No murmur heard.  Pulmonary:      Effort: Pulmonary effort is normal. No respiratory distress.      Breath sounds: Normal breath sounds.   Chest:      Chest wall: No tenderness.   Abdominal:      General: Abdomen is flat. Bowel sounds are normal. There is no distension.      Palpations: Abdomen is soft. There is no mass.      Tenderness: There is no abdominal tenderness. There is no guarding.   Musculoskeletal:         General: No swelling or tenderness. Normal range of motion.      Cervical back: Normal range of motion and neck supple.   Skin:     General: Skin is warm and dry.      Findings: No rash.   Neurological:      General: No focal deficit present.      Mental Status: She is alert and oriented to person, place, and time. Mental status is at baseline.      Gait: Gait normal.   Psychiatric:         Mood and Affect: Mood normal.         Behavior: Behavior normal.         Thought Content: Thought content normal.         Judgment: Judgment normal.           Result Review :                           Assessment and Plan        Diagnoses and all orders for this visit:    1. Annual physical exam (Primary)  -     Ambulatory Referral For Screening Colonoscopy    2. Body mass index (BMI) of 40.0 to 44.9 in adult ([facility])  Comments:  encourage low calorie diet and daily cardio exercise  Orders:  -     phentermine 37.5 MG capsule; Take 1 capsule by mouth Every Morning.  Dispense: 30 capsule; Refill: 0    3. Morbid (severe) obesity due to excess calories ([facility])  Comments:  encourage low calorie diet and daily cardio exercise  Orders:  -     phentermine 37.5 MG capsule; Take 1 capsule by mouth Every Morning.  Dispense: 30 capsule; Refill: 0    4. Screen for colon cancer  -     Ambulatory Referral For Screening Colonoscopy    5.  Migraine with aura and with status migrainosus, not intractable  Comments:  stable on nurtec 75mg prn, continue      Preventative counseling:  Healthy diet  Daily exercise  Get adequate sleep        Follow Up     Return in about 1 month (around 1/7/2023).    Patient was given instructions and counseling regarding her condition or for health maintenance advice. Please see specific information pulled into the AVS if appropriate.     Shayna Abreu  reports that she quit smoking about 8 years ago. Her smoking use included cigarettes. She has a 15.00 pack-year smoking history. She has never used smokeless tobacco..

## 2023-01-09 ENCOUNTER — OFFICE VISIT (OUTPATIENT)
Dept: FAMILY MEDICINE CLINIC | Facility: CLINIC | Age: 48
End: 2023-01-09
Payer: COMMERCIAL

## 2023-01-09 VITALS — BODY MASS INDEX: 37.59 KG/M2 | HEIGHT: 68 IN | WEIGHT: 248 LBS

## 2023-01-09 DIAGNOSIS — E66.01 MORBID (SEVERE) OBESITY DUE TO EXCESS CALORIES: ICD-10-CM

## 2023-01-09 PROCEDURE — 99213 OFFICE O/P EST LOW 20 MIN: CPT | Performed by: NURSE PRACTITIONER

## 2023-01-09 RX ORDER — PHENTERMINE HYDROCHLORIDE 37.5 MG/1
37.5 CAPSULE ORAL EVERY MORNING
Qty: 30 CAPSULE | Refills: 0 | Status: SHIPPED | OUTPATIENT
Start: 2023-01-09 | End: 2023-02-07 | Stop reason: SDUPTHER

## 2023-01-09 NOTE — PROGRESS NOTES
You have chosen to receive care through a telehealth visit.  Do you consent to use a video/audio connection for your medical care today? Yes    Mode of visit: video    Location of patient[place]    Location of provider: [facility].       Chief Complaint  Obesity (/)    Subjective            Shaynasa RELL Abreu presents to [facility]  History of Present Illness  Pt is a 1 mo f/u for obestiy, weight gain, phentermine #3. Pt's start weight was 268 lbs. Pt reports weight today at 248 lbs. Pt has lost a total of 20 lbs so far. No issues or concerns at this time. Pt would like a refill to go to [facility].         Past Medical History:   Diagnosis Date   • Headache        Allergies   Allergen Reactions   • Penicillins Hives        Past Surgical History:   Procedure Laterality Date   • CHOLECYSTECTOMY  removed approx 14 yrs ago   • TUBAL ABDOMINAL LIGATION  approx 15 yrs ago        Social History     Tobacco Use   • Smoking status: Former     Packs/day: 1.00     Years: 15.00     Pack years: 15.00     Types: Cigarettes     Quit date: 2014     Years since quittin.1   • Smokeless tobacco: Never   Substance Use Topics   • Alcohol use: Not on file       Family History   Problem Relation Age of Onset   • Asthma Mother    • COPD Mother    • Diabetes Mother    • Cancer Father         ESOPHAGUS & STOMACH   • Hearing loss Father    • Vision loss Father         Current Outpatient Medications on File Prior to Visit   Medication Sig   • Rimegepant Sulfate (Nurtec) 75 MG tablet dispersible tablet Take 1 tablet by mouth Daily As Needed (migraine).   • [DISCONTINUED] phentermine 37.5 MG capsule Take 1 capsule by mouth Every Morning.     No current facility-administered medications on file prior to visit.       Health Maintenance Due   Topic Date Due   • COLORECTAL CANCER SCREENING  Never done       Objective     Ht 172.7 cm (68\")   Wt 112 kg (248 lb)   BMI 37.71 kg/m²       Physical  Exam  Neurological:      Mental Status: She is alert.   Psychiatric:         Attention and Perception: Attention normal.         Mood and Affect: Mood normal.         Speech: Speech normal.         Behavior: Behavior normal. Behavior is cooperative.         Thought Content: Thought content normal.           Result Review :                           Assessment and Plan        Diagnoses and all orders for this visit:    1. Body mass index (BMI) of 40.0 to 44.9 in adult ([facility])  Comments:  encourage low calorie diet and daily cardio exercise  Orders:  -     phentermine 37.5 MG capsule; Take 1 capsule by mouth Every Morning.  Dispense: 30 capsule; Refill: 0    2. Morbid (severe) obesity due to excess calories ([facility])  Comments:  encourage low calorie diet and daily cardio exercise  Orders:  -     phentermine 37.5 MG capsule; Take 1 capsule by mouth Every Morning.  Dispense: 30 capsule; Refill: 0              Follow Up     Return in about 1 month (around 2/9/2023).    Patient was given instructions and counseling regarding her condition or for health maintenance advice. Please see specific information pulled into the AVS if appropriate.     Shayna Abreu  reports that she quit smoking about 8 years ago. Her smoking use included cigarettes. She has a 15.00 pack-year smoking history. She has never used smokeless tobacco..

## 2023-02-07 ENCOUNTER — OFFICE VISIT (OUTPATIENT)
Dept: FAMILY MEDICINE CLINIC | Facility: CLINIC | Age: 48
End: 2023-02-07
Payer: COMMERCIAL

## 2023-02-07 VITALS
SYSTOLIC BLOOD PRESSURE: 130 MMHG | BODY MASS INDEX: 38.49 KG/M2 | DIASTOLIC BLOOD PRESSURE: 60 MMHG | HEIGHT: 68 IN | OXYGEN SATURATION: 100 % | HEART RATE: 79 BPM | WEIGHT: 254 LBS

## 2023-02-07 DIAGNOSIS — Z12.31 ENCOUNTER FOR SCREENING MAMMOGRAM FOR MALIGNANT NEOPLASM OF BREAST: ICD-10-CM

## 2023-02-07 DIAGNOSIS — Z01.419 WELL WOMAN EXAM: Primary | ICD-10-CM

## 2023-02-07 DIAGNOSIS — E66.01 MORBID (SEVERE) OBESITY DUE TO EXCESS CALORIES: ICD-10-CM

## 2023-02-07 DIAGNOSIS — Z12.4 SCREENING FOR CERVICAL CANCER: ICD-10-CM

## 2023-02-07 LAB
BILIRUB BLD-MCNC: NEGATIVE MG/DL
CLARITY, POC: CLEAR
COLOR UR: YELLOW
EXPIRATION DATE: ABNORMAL
GLUCOSE UR STRIP-MCNC: NEGATIVE MG/DL
KETONES UR QL: NEGATIVE
LEUKOCYTE EST, POC: NEGATIVE
Lab: ABNORMAL
NITRITE UR-MCNC: NEGATIVE MG/ML
PH UR: 5.5 [PH] (ref 5–8)
PROT UR STRIP-MCNC: NEGATIVE MG/DL
RBC # UR STRIP: ABNORMAL /UL
SP GR UR: 1 (ref 1–1.03)
UROBILINOGEN UR QL: NORMAL

## 2023-02-07 PROCEDURE — 99396 PREV VISIT EST AGE 40-64: CPT | Performed by: NURSE PRACTITIONER

## 2023-02-07 PROCEDURE — 81003 URINALYSIS AUTO W/O SCOPE: CPT | Performed by: NURSE PRACTITIONER

## 2023-02-07 PROCEDURE — G0123 SCREEN CERV/VAG THIN LAYER: HCPCS | Performed by: NURSE PRACTITIONER

## 2023-02-07 RX ORDER — PHENTERMINE HYDROCHLORIDE 37.5 MG/1
37.5 CAPSULE ORAL EVERY MORNING
Qty: 30 CAPSULE | Refills: 0 | Status: SHIPPED | OUTPATIENT
Start: 2023-02-07 | End: 2023-03-06 | Stop reason: SDUPTHER

## 2023-02-07 NOTE — PROGRESS NOTES
Chief Complaint  Gynecologic Exam and Weight Gain/ Well woman exam    Subjective            Shayna Abreu presents to [facility]  History of Present Illness  Pt is here for routine pap. LMP unknown. Pt had ablation. No issues or concerns at this time.     Pt is a f/u for weight gain. Phentermine #4. Pt's start weight was 268 lbs. Pts weight today is 254 pt has lost a total of 14 lbs so far. Pt has gained 4 lbs since LOV. Pt states there has been a lot of stress with family situations, her father was diagnosed with stomach cancer and she reports she plans on getting back on track for weight loss.    Pt is due mammogram 2023            Past Medical History:   Diagnosis Date   • Headache        Allergies   Allergen Reactions   • Penicillins Hives        Past Surgical History:   Procedure Laterality Date   • CHOLECYSTECTOMY  removed approx 14 yrs ago   • TUBAL ABDOMINAL LIGATION  approx 15 yrs ago        Social History     Tobacco Use   • Smoking status: Former     Packs/day: 1.00     Years: 15.00     Pack years: 15.00     Types: Cigarettes     Quit date: 2014     Years since quittin.2     Passive exposure: Past   • Smokeless tobacco: Never   Substance Use Topics   • Alcohol use: Not on file       Family History   Problem Relation Age of Onset   • Asthma Mother    • COPD Mother    • Diabetes Mother    • Cancer Father         ESOPHAGUS & STOMACH   • Hearing loss Father    • Vision loss Father         Current Outpatient Medications on File Prior to Visit   Medication Sig   • Rimegepant Sulfate (Nurtec) 75 MG tablet dispersible tablet Take 1 tablet by mouth Daily As Needed (migraine).   • [DISCONTINUED] phentermine 37.5 MG capsule Take 1 capsule by mouth Every Morning.     No current facility-administered medications on file prior to visit.       Health Maintenance Due   Topic Date Due   • COLORECTAL CANCER SCREENING  Never done   • COVID-19 Vaccine (3 - Booster for Pfizer  "series) 06/08/2021       Objective     /60   Pulse 79   Ht 172.7 cm (68\")   Wt 115 kg (254 lb)   SpO2 100%   BMI 38.62 kg/m²       Physical Exam  Constitutional:       General: She is awake. She is not in acute distress.     Appearance: Normal appearance. She is normal weight. She is not ill-appearing.   HENT:      Head: Normocephalic.      Right Ear: Tympanic membrane, ear canal and external ear normal.      Left Ear: Tympanic membrane, ear canal and external ear normal.      Nose: Nose normal.      Mouth/Throat:      Mouth: Mucous membranes are moist.      Pharynx: Oropharynx is clear.   Cardiovascular:      Rate and Rhythm: Normal rate and regular rhythm.      Heart sounds: Normal heart sounds, S1 normal and S2 normal.   Pulmonary:      Effort: Pulmonary effort is normal.      Breath sounds: Normal breath sounds.   Chest:   Breasts:     Right: Normal. No swelling, mass, nipple discharge, skin change or tenderness.      Left: Normal. No swelling, mass, nipple discharge, skin change or tenderness.   Abdominal:      General: Abdomen is flat. Bowel sounds are normal.      Palpations: Abdomen is soft.      Tenderness: There is no abdominal tenderness.   Genitourinary:     General: Normal vulva.      Exam position: Knee-chest position.      Pubic Area: No rash.       Labia:         Right: No rash, tenderness or lesion.         Left: No rash, tenderness or lesion.       Urethra: No urethral pain, urethral swelling or urethral lesion.      Vagina: Normal. No vaginal discharge.      Cervix: Normal.      Uterus: Normal.       Adnexa: Right adnexa normal and left adnexa normal.      Rectum: Normal.      Comments: Pap obtained via cytobrush without complication  Musculoskeletal:         General: Normal range of motion.      Cervical back: Normal range of motion and neck supple.      Right lower leg: No edema.      Left lower leg: No edema.   Skin:     General: Skin is warm.      Findings: No lesion or rash. "   Neurological:      General: No focal deficit present.      Mental Status: She is alert and oriented to person, place, and time. Mental status is at baseline.      Motor: Motor function is intact.      Coordination: Coordination is intact.      Gait: Gait is intact.   Psychiatric:         Attention and Perception: Attention and perception normal.         Mood and Affect: Mood and affect normal.         Speech: Speech normal.         Behavior: Behavior normal. Behavior is cooperative.         Thought Content: Thought content normal.         Cognition and Memory: Cognition and memory normal.         Judgment: Judgment normal.           Result Review :                           Assessment and Plan        Diagnoses and all orders for this visit:    1. Well woman exam (Primary)  -     POCT urinalysis dipstick, automated  -     IGP,rfx Aptima HPV All Pth; Future  -     IGP,rfx Aptima HPV All Pth    2. Body mass index (BMI) of 40.0 to 44.9 in adult ([facility])  Comments:  encourage low calorie diet and daily cardio exercise  Orders:  -     phentermine 37.5 MG capsule; Take 1 capsule by mouth Every Morning.  Dispense: 30 capsule; Refill: 0    3. Morbid (severe) obesity due to excess calories ([facility])  Comments:  encourage low calorie diet and daily cardio exercise  Orders:  -     phentermine 37.5 MG capsule; Take 1 capsule by mouth Every Morning.  Dispense: 30 capsule; Refill: 0    4. Screening for cervical cancer  -     IGP,rfx Aptima HPV All Pth; Future  -     IGP,rfx Aptima HPV All Pth    5. Encounter for screening mammogram for malignant neoplasm of breast  -     Mammo Screening Digital Tomosynthesis Bilateral With CAD; Future      Preventative Counseling:  Advised monthly self breast exams  Healthy diet  Daily exercise        Follow Up     Return in about 1 month (around 3/7/2023).    Patient was given instructions and counseling regarding her condition or for health maintenance advice. Please see specific information pulled  into the AVS if appropriate.     Shayna Abreu  reports that she quit smoking about 8 years ago. Her smoking use included cigarettes. She has a 15.00 pack-year smoking history. She has been exposed to tobacco smoke. She has never used smokeless tobacco..

## 2023-02-08 ENCOUNTER — TELEPHONE (OUTPATIENT)
Dept: FAMILY MEDICINE CLINIC | Facility: CLINIC | Age: 48
End: 2023-02-08

## 2023-02-08 NOTE — TELEPHONE ENCOUNTER
Spoke w/pt - advised cannot refund since it was from another day - could be contributed to future visit - advised pt to reach out to billing to be sure.

## 2023-02-08 NOTE — TELEPHONE ENCOUNTER
Caller: Shayna Abreu    Relationship: Self    Best call back number: [phone]    Who are you requesting to speak with (clinical staff, provider,  specific staff member):     What was the call regarding: PATIENT WAS SEEN YESTERDAY FOR A PAP SMEAR. SHE NOTICED THAT SHE WAS CHARGED A $30 COPAY AND THE APPOINTMENT IS SUPPOSED TO BE COVERED BY INSURANCE WITHOUT HER HAVING TO PAY ANYTHING. PLEASE CALL PATIENT TO ADVISE.

## 2023-02-14 LAB
CONV .: NORMAL
CYTOLOGIST CVX/VAG CYTO: NORMAL
CYTOLOGY CVX/VAG DOC CYTO: NORMAL
CYTOLOGY CVX/VAG DOC THIN PREP: NORMAL
DX ICD CODE: NORMAL
HIV 1 & 2 AB SER-IMP: NORMAL
OTHER STN SPEC: NORMAL
STAT OF ADQ CVX/VAG CYTO-IMP: NORMAL

## 2023-03-06 ENCOUNTER — TELEMEDICINE (OUTPATIENT)
Dept: FAMILY MEDICINE CLINIC | Facility: CLINIC | Age: 48
End: 2023-03-06
Payer: COMMERCIAL

## 2023-03-06 VITALS — BODY MASS INDEX: 38.04 KG/M2 | WEIGHT: 251 LBS | HEIGHT: 68 IN

## 2023-03-06 DIAGNOSIS — E66.09 CLASS 2 OBESITY DUE TO EXCESS CALORIES WITHOUT SERIOUS COMORBIDITY WITH BODY MASS INDEX (BMI) OF 38.0 TO 38.9 IN ADULT: Primary | ICD-10-CM

## 2023-03-06 PROCEDURE — 99213 OFFICE O/P EST LOW 20 MIN: CPT | Performed by: NURSE PRACTITIONER

## 2023-03-06 RX ORDER — PHENTERMINE HYDROCHLORIDE 37.5 MG/1
37.5 CAPSULE ORAL EVERY MORNING
Qty: 30 CAPSULE | Refills: 0 | Status: SHIPPED | OUTPATIENT
Start: 2023-03-06 | End: 2023-04-05 | Stop reason: SDUPTHER

## 2023-03-06 NOTE — PROGRESS NOTES
You have chosen to receive care through a telehealth visit.  Do you consent to use a video/audio connection for your medical care today? Yes    Mode of visit: video     Location of patient[place]    Location of provider: [facility]       Chief Complaint  Obesity (/)    Subjective            Shayna RELL Abreu presents to [facility]  History of Present Illness  Pt is a 1 mo f/u for obesity, phentermine #5. Pt's start weight was 268 lbs. Pt reports weight today at 251 lbs. Pt has lost a total of 15 lbs. No issues or concerns at this time. Pt would like a refill to go to [facility].         Past Medical History:   Diagnosis Date   • Headache        Allergies   Allergen Reactions   • Penicillins Hives        Past Surgical History:   Procedure Laterality Date   • CHOLECYSTECTOMY  removed approx 14 yrs ago   • TUBAL ABDOMINAL LIGATION  approx 15 yrs ago        Social History     Tobacco Use   • Smoking status: Former     Packs/day: 1.00     Years: 15.00     Pack years: 15.00     Types: Cigarettes     Quit date: 2014     Years since quittin.3     Passive exposure: Past   • Smokeless tobacco: Never   Substance Use Topics   • Alcohol use: Not on file       Family History   Problem Relation Age of Onset   • Asthma Mother    • COPD Mother    • Diabetes Mother    • Cancer Father         ESOPHAGUS & STOMACH   • Hearing loss Father    • Vision loss Father         Current Outpatient Medications on File Prior to Visit   Medication Sig   • Rimegepant Sulfate (Nurtec) 75 MG tablet dispersible tablet Take 1 tablet by mouth Daily As Needed (migraine).   • [DISCONTINUED] phentermine 37.5 MG capsule Take 1 capsule by mouth Every Morning.     No current facility-administered medications on file prior to visit.       Health Maintenance Due   Topic Date Due   • COLORECTAL CANCER SCREENING  Never done   • COVID-19 Vaccine (3 - Booster for Pfizer series) 2021       Objective     Ht  "172.7 cm (68\")   Wt 114 kg (251 lb)   BMI 38.16 kg/m²       Physical Exam  Neurological:      Mental Status: She is alert.   Psychiatric:         Attention and Perception: Attention normal.         Mood and Affect: Mood and affect normal.         Speech: Speech normal.         Behavior: Behavior normal. Behavior is cooperative.         Thought Content: Thought content normal.         Judgment: Judgment normal.           Result Review :                           Assessment and Plan        Diagnoses and all orders for this visit:    1. Class 2 obesity due to excess calories without serious comorbidity with body mass index (BMI) of 38.0 to 38.9 in adult (Primary)  Comments:  encourage low calorie diet and daily cardio exercise  Orders:  -     phentermine 37.5 MG capsule; Take 1 capsule by mouth Every Morning.  Dispense: 30 capsule; Refill: 0              Follow Up     Return in about 1 month (around 4/6/2023).    Patient was given instructions and counseling regarding her condition or for health maintenance advice. Please see specific information pulled into the AVS if appropriate.     Shayna Abreu  reports that she quit smoking about 8 years ago. Her smoking use included cigarettes. She has a 15.00 pack-year smoking history. She has been exposed to tobacco smoke. She has never used smokeless tobacco..  "

## 2023-03-22 ENCOUNTER — OFFICE VISIT (OUTPATIENT)
Dept: SURGERY | Facility: CLINIC | Age: 48
End: 2023-03-22
Payer: COMMERCIAL

## 2023-03-22 ENCOUNTER — PREP FOR SURGERY (OUTPATIENT)
Dept: OTHER | Facility: HOSPITAL | Age: 48
End: 2023-03-22
Payer: COMMERCIAL

## 2023-03-22 VITALS — HEART RATE: 83 BPM | WEIGHT: 255 LBS | BODY MASS INDEX: 38.65 KG/M2 | HEIGHT: 68 IN

## 2023-03-22 DIAGNOSIS — Z12.11 SCREENING FOR MALIGNANT NEOPLASM OF COLON: Primary | ICD-10-CM

## 2023-03-22 PROCEDURE — S0285 CNSLT BEFORE SCREEN COLONOSC: HCPCS | Performed by: NURSE PRACTITIONER

## 2023-03-22 RX ORDER — PEG-3350, SODIUM SULFATE, SODIUM CHLORIDE, POTASSIUM CHLORIDE, SODIUM ASCORBATE AND ASCORBIC ACID 7.5-2.691G
1000 KIT ORAL ONCE
Qty: 1000 ML | Refills: 0 | Status: SHIPPED | OUTPATIENT
Start: 2023-03-22 | End: 2023-03-22

## 2023-03-22 NOTE — PROGRESS NOTES
Chief Complaint: Colonoscopy (consult)    Subjective      Colonoscopy consultation       History of Present Illness  Shayna Abreu is a 47 y.o. female presents to [facility] for colonoscopy consultation.    Patient presents today on referral from Jenni Pringle for colonoscopy consultation.  Patient denies any abdominal pain, change in bowel habit, or rectal bleeding.    Denies any family history of colorectal cancer.    No previous colonoscopy.    Objective     Past Medical History:   Diagnosis Date   • Headache        Past Surgical History:   Procedure Laterality Date   • CHOLECYSTECTOMY  removed approx 14 yrs ago   • TUBAL ABDOMINAL LIGATION  approx 15 yrs ago       Outpatient Medications Marked as Taking for the 3/22/23 encounter (Office Visit) with Lexa    Medication Sig Dispense Refill   • phentermine 37.5 MG capsule Take 1 capsule by mouth Every Morning. 30 capsule 0   • Rimegepant Sulfate (Nurtec) 75 MG tablet dispersible tablet Take 1 tablet by mouth Daily As Needed (migraine). 16 tablet 1       Allergies   Allergen Reactions   • Penicillins Hives        Family History   Problem Relation Age of Onset   • Asthma Mother    • COPD Mother    • Diabetes Mother    • Cancer Father         ESOPHAGUS & STOMACH   • Hearing loss Father    • Vision loss Father        Social History     Socioeconomic History   • Marital status:    Tobacco Use   • Smoking status: Former     Packs/day: 1.00     Years: 15.00     Pack years: 15.00     Types: Cigarettes     Quit date: 2014     Years since quittin.3     Passive exposure: Past   • Smokeless tobacco: Never   Vaping Use   • Vaping Use: Never used   Substance and Sexual Activity   • Alcohol use: Never   • Drug use: Never   • Sexual activity: Defer       Review of Systems   Constitutional: Negative for chills and fever.   Gastrointestinal: Negative for abdominal distention, abdominal pain, anal bleeding, blood in  "stool, constipation, diarrhea and rectal pain.        Vital Signs:   Pulse 83   Ht 172.7 cm (68\")   Wt 116 kg (255 lb)   BMI 38.77 kg/m²      Physical Exam  Vitals and nursing note reviewed.   Constitutional:       General: She is not in acute distress.     Appearance: Normal appearance.   HENT:      Head: Normocephalic.   Cardiovascular:      Rate and Rhythm: Normal rate.   Pulmonary:      Effort: Pulmonary effort is normal.      Breath sounds: No stridor.   Abdominal:      Palpations: Abdomen is soft.      Tenderness: There is no guarding.   Musculoskeletal:         General: No deformity. Normal range of motion.   Skin:     General: Skin is warm and dry.   Neurological:      General: No focal deficit present.      Mental Status: She is alert.   Psychiatric:         Mood and Affect: Mood normal.         Thought Content: Thought content normal.          Result Review :          []  Laboratory  []  Radiology  []  Pathology  []  Microbiology  []  EKG/Telemetry   []  Cardiology/Vascular   []  Old records  I spent 20 minutes caring for Shayna on this date of service. This time includes time spent by me in the following activities: reviewing tests, obtaining and/or reviewing a separately obtained history, performing a medically appropriate examination and/or evaluation, ordering medications, tests, or procedures, and documenting information in the medical record.       Assessment and Plan    Diagnoses and all orders for this visit:    1. Screening for malignant neoplasm of colon (Primary)    Other orders  -     PEG-KCl-NaCl-NaSulf-Na Asc-C (MOVIPREP) 100 g reconstituted solution powder; Take 1,000 mL by mouth 1 (One) Time for 1 dose.  Dispense: 1000 mL; Refill: 0        Follow Up   Return for Scheduled colonoscopy with Dr. Guajardo on 7/31/2023 at [facility].     Phone PAT.  Hospital call arrival time.    Possible risks/complications, benefits, and alternatives to surgical or invasive procedure have been " explained to patient and/or legal guardian.     Patient has been evaluated and can tolerate anesthesia and/or sedation. Risks, benefits, and alternatives to anesthesia and sedation have been explained to patient and/or legal guardian.  Patient verbalizes understanding and is willing to proceed with above plan.    Patient was given instructions and counseling regarding her condition or for health maintenance advice. Please see specific information pulled into the AVS if appropriate.

## 2023-04-05 ENCOUNTER — TELEMEDICINE (OUTPATIENT)
Dept: FAMILY MEDICINE CLINIC | Facility: CLINIC | Age: 48
End: 2023-04-05
Payer: COMMERCIAL

## 2023-04-05 VITALS — BODY MASS INDEX: 37.74 KG/M2 | WEIGHT: 249 LBS | HEIGHT: 68 IN

## 2023-04-05 DIAGNOSIS — E66.09 CLASS 2 OBESITY DUE TO EXCESS CALORIES WITHOUT SERIOUS COMORBIDITY WITH BODY MASS INDEX (BMI) OF 37.0 TO 37.9 IN ADULT: Primary | ICD-10-CM

## 2023-04-05 PROCEDURE — 99213 OFFICE O/P EST LOW 20 MIN: CPT | Performed by: NURSE PRACTITIONER

## 2023-04-05 RX ORDER — PHENTERMINE HYDROCHLORIDE 37.5 MG/1
37.5 CAPSULE ORAL EVERY MORNING
Qty: 30 CAPSULE | Refills: 0 | Status: SHIPPED | OUTPATIENT
Start: 2023-04-05

## 2023-04-05 NOTE — PROGRESS NOTES
You have chosen to receive care through a telehealth visit.  Do you consent to use a video/audio connection for your medical care today? Yes    Mode of visit: video    [place] of [place] [facility]    [place]  [facility] [place], [place]      Chief Complaint  Obesity    Subjective            Shayna RELL Abreu presents to [facility]  History of Present Illness  Pt is a 1 mo f/u for obesity, phentermine #6. Pt's start weight was 268 lbs. Pt reports weight today at 249 lbs. Pt has lost a total of 17 lbs. No issues or concerns at this time. Pt would like a refill to go to [facility].     Mammo is scheduled for 2023    Colonoscopy scheduled with Dr. Guajardo 2023        Past Medical History:   Diagnosis Date   • Headache        Allergies   Allergen Reactions   • Penicillins Hives        Past Surgical History:   Procedure Laterality Date   • CHOLECYSTECTOMY  removed approx 14 yrs ago   • TUBAL ABDOMINAL LIGATION  approx 15 yrs ago        Social History     Tobacco Use   • Smoking status: Former     Packs/day: 1.00     Years: 15.00     Pack years: 15.00     Types: Cigarettes     Quit date: 2014     Years since quittin.4     Passive exposure: Past   • Smokeless tobacco: Never   Substance Use Topics   • Alcohol use: Never       Family History   Problem Relation Age of Onset   • Asthma Mother    • COPD Mother    • Diabetes Mother    • Cancer Father         ESOPHAGUS & STOMACH   • Hearing loss Father    • Vision loss Father         Current Outpatient Medications on File Prior to Visit   Medication Sig   • Rimegepant Sulfate (Nurtec) 75 MG tablet dispersible tablet Take 1 tablet by mouth Daily As Needed (migraine).   • [DISCONTINUED] phentermine 37.5 MG capsule Take 1 capsule by mouth Every Morning.     No current facility-administered medications on file prior to visit.       Health Maintenance Due   Topic Date Due   • COLORECTAL CANCER SCREENING  Never  "done   • COVID-19 Vaccine (3 - Booster for Pfizer series) 06/08/2021       Objective     Ht 172.7 cm (68\")   Wt 113 kg (249 lb)   BMI 37.86 kg/m²       Physical Exam  Constitutional:       Appearance: Normal appearance.   Neurological:      Mental Status: She is alert.   Psychiatric:         Attention and Perception: Attention normal.         Mood and Affect: Mood normal.         Speech: Speech normal.         Behavior: Behavior normal. Behavior is cooperative.         Thought Content: Thought content normal.         Judgment: Judgment normal.           Result Review :                           Assessment and Plan        Diagnoses and all orders for this visit:    1. Class 2 obesity due to excess calories without serious comorbidity with body mass index (BMI) of 37.0 to 37.9 in adult (Primary)  Comments:  encourage low calorie diet and daily cardio exercise  Orders:  -     phentermine 37.5 MG capsule; Take 1 capsule by mouth Every Morning.  Dispense: 30 capsule; Refill: 0              Follow Up     Return in about 6 months (around 10/5/2023).    Patient was given instructions and counseling regarding her condition or for health maintenance advice. Please see specific information pulled into the AVS if appropriate.     Shayna Pringle, SUKUMAR     "

## 2023-05-11 ENCOUNTER — HOSPITAL ENCOUNTER (OUTPATIENT)
Dept: MAMMOGRAPHY | Facility: HOSPITAL | Age: 48
Discharge: HOME | End: 2023-05-11
Admitting: NURSE PRACTITIONER
Payer: COMMERCIAL

## 2023-05-11 DIAGNOSIS — Z12.31 ENCOUNTER FOR SCREENING MAMMOGRAM FOR MALIGNANT NEOPLASM OF BREAST: ICD-10-CM

## 2023-05-11 PROCEDURE — 77063 BREAST TOMOSYNTHESIS BI: CPT

## 2023-05-11 PROCEDURE — 77067 SCR MAMMO BI INCL CAD: CPT

## 2023-05-22 ENCOUNTER — TELEPHONE (OUTPATIENT)
Dept: FAMILY MEDICINE CLINIC | Facility: CLINIC | Age: 48
End: 2023-05-22
Payer: COMMERCIAL

## 2023-05-22 DIAGNOSIS — G43.801 OTHER MIGRAINE WITH STATUS MIGRAINOSUS, NOT INTRACTABLE: ICD-10-CM

## 2023-05-22 NOTE — TELEPHONE ENCOUNTER
Nurtec denied.     Must try/fail at least 2 of the following: almotriptan, eletriptan, naratriptan, rizatriptan, sumatriptan, zolmitriptan.     Please advise.

## 2023-05-23 RX ORDER — RIMEGEPANT SULFATE 75 MG/75MG
75 TABLET, ORALLY DISINTEGRATING ORAL DAILY PRN
Qty: 16 TABLET | Refills: 2 | Status: SHIPPED | OUTPATIENT
Start: 2023-05-23

## 2023-06-23 PROBLEM — K21.9 GERD (GASTROESOPHAGEAL REFLUX DISEASE): Status: ACTIVE | Noted: 2023-03-22

## 2023-07-28 ENCOUNTER — TELEPHONE (OUTPATIENT)
Dept: SURGERY | Facility: CLINIC | Age: 48
End: 2023-07-28
Payer: COMMERCIAL

## 2023-07-31 ENCOUNTER — ANESTHESIA EVENT (OUTPATIENT)
Dept: GASTROENTEROLOGY | Facility: HOSPITAL | Age: 48
End: 2023-07-31
Payer: COMMERCIAL

## 2023-07-31 ENCOUNTER — HOSPITAL ENCOUNTER (OUTPATIENT)
Facility: HOSPITAL | Age: 48
Setting detail: HOSPITAL OUTPATIENT SURGERY
Discharge: HOME | End: 2023-07-31
Attending: SURGERY | Admitting: SURGERY
Payer: COMMERCIAL

## 2023-07-31 ENCOUNTER — ANESTHESIA (OUTPATIENT)
Dept: GASTROENTEROLOGY | Facility: HOSPITAL | Age: 48
End: 2023-07-31
Payer: COMMERCIAL

## 2023-07-31 VITALS
SYSTOLIC BLOOD PRESSURE: 114 MMHG | DIASTOLIC BLOOD PRESSURE: 71 MMHG | BODY MASS INDEX: 39.76 KG/M2 | HEIGHT: 68 IN | RESPIRATION RATE: 14 BRPM | OXYGEN SATURATION: 98 % | WEIGHT: 262.35 LBS | TEMPERATURE: 97.8 F | HEART RATE: 71 BPM

## 2023-07-31 DIAGNOSIS — K21.9 GERD (GASTROESOPHAGEAL REFLUX DISEASE): ICD-10-CM

## 2023-07-31 DIAGNOSIS — Z12.11 SCREENING FOR MALIGNANT NEOPLASM OF COLON: ICD-10-CM

## 2023-07-31 PROCEDURE — 88305 TISSUE EXAM BY PATHOLOGIST: CPT | Performed by: SURGERY

## 2023-07-31 PROCEDURE — 88342 IMHCHEM/IMCYTCHM 1ST ANTB: CPT | Performed by: SURGERY

## 2023-07-31 PROCEDURE — 25010000002 PROPOFOL 10 MG/ML EMULSION: Performed by: NURSE ANESTHETIST, CERTIFIED REGISTERED

## 2023-07-31 RX ORDER — PROPOFOL 10 MG/ML
VIAL (ML) INTRAVENOUS AS NEEDED
Status: DISCONTINUED | OUTPATIENT
Start: 2023-07-31 | End: 2023-07-31 | Stop reason: SURG

## 2023-07-31 RX ORDER — SODIUM CHLORIDE, SODIUM LACTATE, POTASSIUM CHLORIDE, CALCIUM CHLORIDE 600; 310; 30; 20 MG/100ML; MG/100ML; MG/100ML; MG/100ML
30 INJECTION, SOLUTION INTRAVENOUS CONTINUOUS
Status: DISCONTINUED | OUTPATIENT
Start: 2023-07-31 | End: 2023-07-31 | Stop reason: HOSPADM

## 2023-07-31 RX ORDER — LIDOCAINE HYDROCHLORIDE 20 MG/ML
INJECTION, SOLUTION EPIDURAL; INFILTRATION; INTRACAUDAL; PERINEURAL AS NEEDED
Status: DISCONTINUED | OUTPATIENT
Start: 2023-07-31 | End: 2023-07-31 | Stop reason: SURG

## 2023-07-31 RX ORDER — ONDANSETRON 4 MG/1
4 TABLET, FILM COATED ORAL ONCE AS NEEDED
Status: DISCONTINUED | OUTPATIENT
Start: 2023-07-31 | End: 2023-07-31 | Stop reason: HOSPADM

## 2023-07-31 RX ORDER — ONDANSETRON 2 MG/ML
4 INJECTION INTRAMUSCULAR; INTRAVENOUS ONCE AS NEEDED
Status: DISCONTINUED | OUTPATIENT
Start: 2023-07-31 | End: 2023-07-31 | Stop reason: HOSPADM

## 2023-07-31 RX ADMIN — PROPOFOL 125 MCG/KG/MIN: 10 INJECTION, EMULSION INTRAVENOUS at 08:37

## 2023-07-31 RX ADMIN — LIDOCAINE HYDROCHLORIDE 100 MG: 20 INJECTION, SOLUTION EPIDURAL; INFILTRATION; INTRACAUDAL; PERINEURAL at 08:37

## 2023-07-31 RX ADMIN — PROPOFOL 150 MG: 10 INJECTION, EMULSION INTRAVENOUS at 08:37

## 2023-07-31 RX ADMIN — SODIUM CHLORIDE, POTASSIUM CHLORIDE, SODIUM LACTATE AND CALCIUM CHLORIDE 30 ML/HR: 600; 310; 30; 20 INJECTION, SOLUTION INTRAVENOUS at 08:20

## 2023-08-01 LAB
CYTO UR: NORMAL
LAB AP CASE REPORT: NORMAL
LAB AP CLINICAL INFORMATION: NORMAL
LAB AP SPECIAL STAINS: NORMAL
PATH REPORT.FINAL DX SPEC: NORMAL
PATH REPORT.GROSS SPEC: NORMAL

## 2023-08-14 ENCOUNTER — OFFICE VISIT (OUTPATIENT)
Dept: SURGERY | Facility: CLINIC | Age: 48
End: 2023-08-14
Payer: COMMERCIAL

## 2023-08-14 VITALS — RESPIRATION RATE: 16 BRPM | HEIGHT: 68 IN | WEIGHT: 261 LBS | BODY MASS INDEX: 39.56 KG/M2

## 2023-08-14 DIAGNOSIS — B96.81 HELICOBACTER PYLORI GASTRITIS: Primary | ICD-10-CM

## 2023-08-14 DIAGNOSIS — K29.70 HELICOBACTER PYLORI GASTRITIS: Primary | ICD-10-CM

## 2023-08-14 PROCEDURE — 99212 OFFICE O/P EST SF 10 MIN: CPT | Performed by: SURGERY

## 2023-08-14 RX ORDER — CIPROFLOXACIN 250 MG/1
250 TABLET, FILM COATED ORAL 2 TIMES DAILY
Qty: 28 TABLET | Refills: 0 | Status: SHIPPED | OUTPATIENT
Start: 2023-08-14 | End: 2023-08-28

## 2023-08-14 RX ORDER — CIPROFLOXACIN 250 MG/1
250 TABLET, FILM COATED ORAL 2 TIMES DAILY
Qty: 28 TABLET | Refills: 0 | Status: SHIPPED | OUTPATIENT
Start: 2023-08-14 | End: 2023-08-14 | Stop reason: SDUPTHER

## 2023-08-14 RX ORDER — CLARITHROMYCIN 500 MG/1
500 TABLET, COATED ORAL 2 TIMES DAILY
Qty: 28 TABLET | Refills: 0 | Status: SHIPPED | OUTPATIENT
Start: 2023-08-14 | End: 2023-08-28

## 2023-08-14 RX ORDER — POLYETHYLENE GLYCOL 3350, SODIUM SULFATE, SODIUM CHLORIDE, POTASSIUM CHLORIDE, SODIUM ASCORBATE, AND ASCORBIC ACID 7.5-2.691G
KIT ORAL
COMMUNITY
Start: 2023-07-12

## 2023-08-14 RX ORDER — OMEPRAZOLE 40 MG/1
40 CAPSULE, DELAYED RELEASE ORAL DAILY
Qty: 14 CAPSULE | Refills: 0 | Status: SHIPPED | OUTPATIENT
Start: 2023-08-14 | End: 2023-08-14 | Stop reason: SDUPTHER

## 2023-08-14 RX ORDER — CLARITHROMYCIN 500 MG/1
500 TABLET, COATED ORAL 2 TIMES DAILY
Qty: 28 TABLET | Refills: 0 | Status: SHIPPED | OUTPATIENT
Start: 2023-08-14 | End: 2023-08-14 | Stop reason: SDUPTHER

## 2023-08-14 RX ORDER — OMEPRAZOLE 40 MG/1
40 CAPSULE, DELAYED RELEASE ORAL DAILY
Qty: 14 CAPSULE | Refills: 0 | Status: SHIPPED | OUTPATIENT
Start: 2023-08-14 | End: 2023-08-28

## 2023-08-14 NOTE — PROGRESS NOTES
Chief Complaint  Colonoscopy and Post-op    Subjective          Shayna Abreu presents to [facility]  History of Present Illness    Shayna Abreu is a 47 y.o. female  who presents today for a postoperative visit.     Patient is here for a follow-up after a recent EGD and colonoscopy.  Her colonoscopy was normal.  We did an EGD and she did have H. pylori seen on the biopsy.  She does have a family history of stomach cancer as her father was diagnosed with stomach cancer here recently.    Past History:  Medical History: has a past medical history of Headache and Migraines.   Surgical History: has a past surgical history that includes Cholecystectomy (removed approx 14 yrs ago); Tubal ligation (approx 15 yrs ago); Temporomandibular joint arthroplasty; Colonoscopy (N/A, 7/31/2023); and Esophagogastroduodenoscopy (N/A, 7/31/2023).   Family History: family history includes Asthma in her mother; COPD in her mother; Cancer in her father; Diabetes in her mother; Hearing loss in her father; Vision loss in her father.   Social History: reports that she quit smoking about 8 years ago. Her smoking use included cigarettes. She has a 15.00 pack-year smoking history. She has been exposed to tobacco smoke. She has never used smokeless tobacco. She reports that she does not drink alcohol and does not use drugs.  Allergies: Penicillins       Current Outpatient Medications:     PEG-3350/Electrolytes/Ascorbat 100 g reconstituted solution powder, USE AS DIRECTED AS A ONE TIME DOSE, Disp: , Rfl:     ciprofloxacin (CIPRO) 250 MG tablet, Take 1 tablet by mouth 2 (Two) Times a Day for 14 days., Disp: 28 tablet, Rfl: 0    clarithromycin (BIAXIN) 500 MG tablet, Take 1 tablet by mouth 2 (Two) Times a Day for 14 days., Disp: 28 tablet, Rfl: 0    omeprazole (priLOSEC) 40 MG capsule, Take 1 capsule by mouth Daily for 14 days., Disp: 14 capsule, Rfl: 0    Rimegepant Sulfate (Nurtec) 75 MG tablet dispersible  "tablet, Take 1 tablet by mouth Daily As Needed (migraine)., Disp: 16 tablet, Rfl: 2       Physical Exam  He appears well today.  Objective     Vital Signs:   Resp 16   Ht 172.7 cm (67.99\")   Wt 118 kg (261 lb)   BMI 39.69 kg/mý              Assessment and Plan    Diagnoses and all orders for this visit:    1. Helicobacter pylori gastritis (Primary)    We have prescribed her a combination of Biaxin and ciprofloxacin with omeprazole.  We will go ahead and put in an order so she can get an H. pylori breath test here in about 6 weeks once her treatment regimen has been completed.      "

## 2023-09-28 ENCOUNTER — LAB (OUTPATIENT)
Dept: LAB | Facility: HOSPITAL | Age: 48
End: 2023-09-28
Payer: COMMERCIAL

## 2023-09-28 DIAGNOSIS — B96.81 HELICOBACTER PYLORI GASTRITIS: ICD-10-CM

## 2023-09-28 DIAGNOSIS — K29.70 HELICOBACTER PYLORI GASTRITIS: ICD-10-CM

## 2023-09-28 PROCEDURE — 83013 H PYLORI (C-13) BREATH: CPT

## 2023-09-30 LAB — UREA BREATH TEST QL: NEGATIVE

## 2023-12-11 ENCOUNTER — OFFICE VISIT (OUTPATIENT)
Dept: FAMILY MEDICINE CLINIC | Facility: CLINIC | Age: 48
End: 2023-12-11
Payer: COMMERCIAL

## 2023-12-11 ENCOUNTER — LAB (OUTPATIENT)
Dept: LAB | Facility: HOSPITAL | Age: 48
End: 2023-12-11
Payer: COMMERCIAL

## 2023-12-11 VITALS
DIASTOLIC BLOOD PRESSURE: 47 MMHG | BODY MASS INDEX: 40.32 KG/M2 | WEIGHT: 266 LBS | SYSTOLIC BLOOD PRESSURE: 128 MMHG | HEIGHT: 68 IN | HEART RATE: 62 BPM | OXYGEN SATURATION: 97 %

## 2023-12-11 DIAGNOSIS — Z13.220 SCREENING FOR CHOLESTEROL LEVEL: ICD-10-CM

## 2023-12-11 DIAGNOSIS — Z00.00 ANNUAL PHYSICAL EXAM: ICD-10-CM

## 2023-12-11 DIAGNOSIS — Z00.00 ANNUAL PHYSICAL EXAM: Primary | ICD-10-CM

## 2023-12-11 DIAGNOSIS — Z12.31 ENCOUNTER FOR SCREENING MAMMOGRAM FOR MALIGNANT NEOPLASM OF BREAST: ICD-10-CM

## 2023-12-11 DIAGNOSIS — Z13.29 SCREENING FOR THYROID DISORDER: ICD-10-CM

## 2023-12-11 DIAGNOSIS — Z23 NEED FOR INFLUENZA VACCINATION: ICD-10-CM

## 2023-12-11 DIAGNOSIS — G43.801 OTHER MIGRAINE WITH STATUS MIGRAINOSUS, NOT INTRACTABLE: ICD-10-CM

## 2023-12-11 LAB
ALBUMIN SERPL-MCNC: 3.9 G/DL (ref 3.5–5.2)
ALBUMIN/GLOB SERPL: 1.2 G/DL
ALP SERPL-CCNC: 76 U/L (ref 39–117)
ALT SERPL W P-5'-P-CCNC: 14 U/L (ref 1–33)
ANION GAP SERPL CALCULATED.3IONS-SCNC: 10.5 MMOL/L (ref 5–15)
AST SERPL-CCNC: 16 U/L (ref 1–32)
BASOPHILS # BLD AUTO: 0.08 10*3/MM3 (ref 0–0.2)
BASOPHILS NFR BLD AUTO: 1 % (ref 0–1.5)
BILIRUB SERPL-MCNC: 0.3 MG/DL (ref 0–1.2)
BUN SERPL-MCNC: 10 MG/DL (ref 6–20)
BUN/CREAT SERPL: 11.4 (ref 7–25)
CALCIUM SPEC-SCNC: 9.5 MG/DL (ref 8.6–10.5)
CHLORIDE SERPL-SCNC: 104 MMOL/L (ref 98–107)
CHOLEST SERPL-MCNC: 175 MG/DL (ref 0–200)
CO2 SERPL-SCNC: 23.5 MMOL/L (ref 22–29)
CREAT SERPL-MCNC: 0.88 MG/DL (ref 0.57–1)
DEPRECATED RDW RBC AUTO: 41.2 FL (ref 37–54)
EGFRCR SERPLBLD CKD-EPI 2021: 82.7 ML/MIN/1.73
EOSINOPHIL # BLD AUTO: 0.22 10*3/MM3 (ref 0–0.4)
EOSINOPHIL NFR BLD AUTO: 2.7 % (ref 0.3–6.2)
ERYTHROCYTE [DISTWIDTH] IN BLOOD BY AUTOMATED COUNT: 12.5 % (ref 12.3–15.4)
GLOBULIN UR ELPH-MCNC: 3.2 GM/DL
GLUCOSE SERPL-MCNC: 97 MG/DL (ref 65–99)
HCT VFR BLD AUTO: 39.2 % (ref 34–46.6)
HDLC SERPL-MCNC: 54 MG/DL (ref 40–60)
HGB BLD-MCNC: 13.1 G/DL (ref 12–15.9)
IMM GRANULOCYTES # BLD AUTO: 0.01 10*3/MM3 (ref 0–0.05)
IMM GRANULOCYTES NFR BLD AUTO: 0.1 % (ref 0–0.5)
LDLC SERPL CALC-MCNC: 99 MG/DL (ref 0–100)
LDLC/HDLC SERPL: 1.77 {RATIO}
LYMPHOCYTES # BLD AUTO: 2.31 10*3/MM3 (ref 0.7–3.1)
LYMPHOCYTES NFR BLD AUTO: 28.2 % (ref 19.6–45.3)
MCH RBC QN AUTO: 30.3 PG (ref 26.6–33)
MCHC RBC AUTO-ENTMCNC: 33.4 G/DL (ref 31.5–35.7)
MCV RBC AUTO: 90.5 FL (ref 79–97)
MONOCYTES # BLD AUTO: 0.47 10*3/MM3 (ref 0.1–0.9)
MONOCYTES NFR BLD AUTO: 5.7 % (ref 5–12)
NEUTROPHILS NFR BLD AUTO: 5.11 10*3/MM3 (ref 1.7–7)
NEUTROPHILS NFR BLD AUTO: 62.3 % (ref 42.7–76)
NRBC BLD AUTO-RTO: 0 /100 WBC (ref 0–0.2)
PLATELET # BLD AUTO: 281 10*3/MM3 (ref 140–450)
PMV BLD AUTO: 11.6 FL (ref 6–12)
POTASSIUM SERPL-SCNC: 4.2 MMOL/L (ref 3.5–5.2)
PROT SERPL-MCNC: 7.1 G/DL (ref 6–8.5)
RBC # BLD AUTO: 4.33 10*6/MM3 (ref 3.77–5.28)
SODIUM SERPL-SCNC: 138 MMOL/L (ref 136–145)
TRIGL SERPL-MCNC: 127 MG/DL (ref 0–150)
TSH SERPL DL<=0.05 MIU/L-ACNC: 2.63 UIU/ML (ref 0.27–4.2)
VLDLC SERPL-MCNC: 22 MG/DL (ref 5–40)
WBC NRBC COR # BLD AUTO: 8.2 10*3/MM3 (ref 3.4–10.8)

## 2023-12-11 PROCEDURE — 80050 GENERAL HEALTH PANEL: CPT

## 2023-12-11 PROCEDURE — 36415 COLL VENOUS BLD VENIPUNCTURE: CPT

## 2023-12-11 PROCEDURE — 80061 LIPID PANEL: CPT

## 2023-12-11 RX ORDER — RIMEGEPANT SULFATE 75 MG/75MG
75 TABLET, ORALLY DISINTEGRATING ORAL DAILY PRN
Qty: 16 TABLET | Refills: 2 | Status: SHIPPED | OUTPATIENT
Start: 2023-12-11

## 2023-12-11 NOTE — PROGRESS NOTES
Chief Complaint  Annual Exam and Migraine    Subjective            Shaynasa RELL Abreu presents to [facility]  History of Present Illness  Pt is an annual CPE for migraines. No issues or concerns at this time. Nurtec is working well for her and she requests a refill on it.    Pt is due labs.    Mammogram: 23    Pap: 23    Colon: 23    Pt is due flu vaccine. Pt will get today.         Past Medical History:   Diagnosis Date    Headache     Migraines        Allergies   Allergen Reactions    Penicillins Hives        Past Surgical History:   Procedure Laterality Date    CHOLECYSTECTOMY  removed approx 14 yrs ago    COLONOSCOPY N/A 2023    Procedure: COLONOSCOPY;  Surgeon: Hany Guajardo MD;  Location: [facility];  Service: General;  Laterality: N/A;  NORMAL    ENDOSCOPY N/A 2023    Procedure: ESOPHAGOGASTRODUODENOSCOPY;  Surgeon: Hany Guajardo MD;  Location: [facility] ENDOSCOPY;  Service: General;  Laterality: N/A;  NORMAL    TEMPOROMANDIBULAR JOINT ARTHROPLASTY      TUBAL ABDOMINAL LIGATION  approx 15 yrs ago        Social History     Tobacco Use    Smoking status: Former     Packs/day: 1.00     Years: 15.00     Additional pack years: 0.00     Total pack years: 15.00     Types: Cigarettes     Quit date: 2014     Years since quittin.1     Passive exposure: Past    Smokeless tobacco: Never   Substance Use Topics    Alcohol use: Never       Family History   Problem Relation Age of Onset    Asthma Mother     COPD Mother     Diabetes Mother     Cancer Father         ESOPHAGUS & STOMACH    Hearing loss Father     Vision loss Father         Current Outpatient Medications on File Prior to Visit   Medication Sig    [DISCONTINUED] Rimegepant Sulfate (Nurtec) 75 MG tablet dispersible tablet Take 1 tablet by mouth Daily As Needed (migraine).    [DISCONTINUED] PEG-3350/Electrolytes/Ascorbat 100 g reconstituted solution powder USE AS DIRECTED AS A ONE TIME DOSE  "(Patient not taking: Reported on 12/11/2023)     No current facility-administered medications on file prior to visit.       Health Maintenance Due   Topic Date Due    INFLUENZA VACCINE  08/01/2023    ANNUAL PHYSICAL  12/07/2023       Objective     /47   Pulse 62   Ht 172.7 cm (68\")   Wt 121 kg (266 lb)   SpO2 97%   BMI 40.45 kg/m²       Physical Exam  Constitutional:       General: She is not in acute distress.     Appearance: Normal appearance. She is not ill-appearing.   HENT:      Head: Normocephalic and atraumatic.      Right Ear: Tympanic membrane, ear canal and external ear normal.      Left Ear: Tympanic membrane, ear canal and external ear normal.      Nose: Nose normal.   Cardiovascular:      Rate and Rhythm: Normal rate and regular rhythm.      Heart sounds: Normal heart sounds. No murmur heard.  Pulmonary:      Effort: Pulmonary effort is normal. No respiratory distress.      Breath sounds: Normal breath sounds.   Chest:      Chest wall: No tenderness.   Abdominal:      General: Abdomen is flat. Bowel sounds are normal. There is no distension.      Palpations: Abdomen is soft. There is no mass.      Tenderness: There is no abdominal tenderness. There is no guarding.   Musculoskeletal:         General: No swelling or tenderness. Normal range of motion.      Cervical back: Normal range of motion and neck supple.   Skin:     General: Skin is warm and dry.      Findings: No rash.   Neurological:      General: No focal deficit present.      Mental Status: She is alert and oriented to person, place, and time. Mental status is at baseline.      Gait: Gait normal.   Psychiatric:         Mood and Affect: Mood normal.         Behavior: Behavior normal.         Thought Content: Thought content normal.         Judgment: Judgment normal.           Result Review :                           Assessment and Plan        Diagnoses and all orders for this visit:    1. Annual physical exam (Primary)  -     CBC w " AUTO Differential; Future  -     Comprehensive metabolic panel; Future  -     Lipid panel; Future  -     TSH; Future  -     Mammo Screening Digital Tomosynthesis Bilateral With CAD; Future  -     Fluzone (or Fluarix & Flulaval for VFC) >6 Mos ([id number])    2. Screening for cholesterol level  -     Comprehensive metabolic panel; Future  -     Lipid panel; Future    3. Screening for thyroid disorder  -     TSH; Future    4. Encounter for screening mammogram for malignant neoplasm of breast  -     Mammo Screening Digital Tomosynthesis Bilateral With CAD; Future    5. Other migraine with status migrainosus, not intractable  Comments:  stableon nurtec 75mg, continue  Orders:  -     Rimegepant Sulfate (Nurtec) 75 MG tablet dispersible tablet; Take 1 tablet by mouth Daily As Needed (migraine).  Dispense: 16 tablet; Refill: 2    6. Need for influenza vaccination  -     Fluzone (or Fluarix & Flulaval for VFC) >6 Mos ([id number])      Preventative Counseling:  Advised monthly self breast exams  Healthy diet  Daily exercise        Follow Up     Return in about 1 year (around 12/11/2024) for Annual physical.    Patient was given instructions and counseling regarding her condition or for health maintenance advice. Please see specific information pulled into the AVS if appropriate.     Shayna Abreu  reports that she quit smoking about 9 years ago. Her smoking use included cigarettes. She has a 15.00 pack-year smoking history. She has been exposed to tobacco smoke. She has never used smokeless tobacco..

## 2024-03-18 ENCOUNTER — OFFICE VISIT (OUTPATIENT)
Dept: FAMILY MEDICINE CLINIC | Facility: CLINIC | Age: 49
End: 2024-03-18
Payer: COMMERCIAL

## 2024-03-18 VITALS
HEIGHT: 68 IN | WEIGHT: 271 LBS | DIASTOLIC BLOOD PRESSURE: 68 MMHG | HEART RATE: 68 BPM | SYSTOLIC BLOOD PRESSURE: 136 MMHG | BODY MASS INDEX: 41.07 KG/M2 | OXYGEN SATURATION: 100 %

## 2024-03-18 DIAGNOSIS — E66.01 CLASS 3 SEVERE OBESITY DUE TO EXCESS CALORIES WITHOUT SERIOUS COMORBIDITY WITH BODY MASS INDEX (BMI) OF 40.0 TO 44.9 IN ADULT: Primary | ICD-10-CM

## 2024-03-18 DIAGNOSIS — G43.801 OTHER MIGRAINE WITH STATUS MIGRAINOSUS, NOT INTRACTABLE: ICD-10-CM

## 2024-03-18 PROCEDURE — 99213 OFFICE O/P EST LOW 20 MIN: CPT | Performed by: NURSE PRACTITIONER

## 2024-03-18 RX ORDER — SEMAGLUTIDE 0.25 MG/.5ML
0.25 INJECTION, SOLUTION SUBCUTANEOUS
Qty: 2 ML | Refills: 1 | Status: SHIPPED | OUTPATIENT
Start: 2024-03-18

## 2024-03-18 NOTE — PROGRESS NOTES
Chief Complaint  Obesity (/), migraine    Subjective            Shaynasa RELL Abreu presents to [facility]  History of Present Illness  Pt is here f/u migraine and to discuss getting an injectable for weight loss. Pt has tried phentermine in the past and the weight came back. Pt has been unable to tolerate towards the end due to canker sores in the mouth. Pt states her insurance states the injectables are excluded from insurance. Pt has contacted insurance and states the appeal may get the medication approved. Pt has been going to the gym and currently doing weight watchers. Pt states she has gained 8 lbs in the last few wks.         Past Medical History:   Diagnosis Date    Headache     Migraines        Allergies   Allergen Reactions    Penicillins Hives        Past Surgical History:   Procedure Laterality Date    CHOLECYSTECTOMY  removed approx 14 yrs ago    COLONOSCOPY N/A 2023    Procedure: COLONOSCOPY;  Surgeon: Hany Guajardo MD;  Location: [facility] ENDOSCOPY;  Service: General;  Laterality: N/A;  NORMAL    ENDOSCOPY N/A 2023    Procedure: ESOPHAGOGASTRODUODENOSCOPY;  Surgeon: Hany Guajardo MD;  Location: [facility] ENDOSCOPY;  Service: General;  Laterality: N/A;  NORMAL    TEMPOROMANDIBULAR JOINT ARTHROPLASTY      TUBAL ABDOMINAL LIGATION  approx 15 yrs ago        Social History     Tobacco Use    Smoking status: Former     Current packs/day: 0.00     Average packs/day: 1 pack/day for 15.0 years (15.0 ttl pk-yrs)     Types: Cigarettes     Start date: 1999     Quit date: 2014     Years since quittin.3     Passive exposure: Past    Smokeless tobacco: Never   Substance Use Topics    Alcohol use: Never       Family History   Problem Relation Age of Onset    Asthma Mother     COPD Mother     Diabetes Mother     Cancer Father         ESOPHAGUS & STOMACH    Hearing loss Father     Vision loss Father         Current Outpatient Medications on File Prior to Visit  "  Medication Sig    Rimegepant Sulfate (Nurtec) 75 MG tablet dispersible tablet Take 1 tablet by mouth Daily As Needed (migraine).     No current facility-administered medications on file prior to visit.       Health Maintenance Due   Topic Date Due    COVID-19 Vaccine (3 - 2023-24 season) 09/01/2023       Objective     /68   Pulse 68   Ht 172.7 cm (68\")   Wt 123 kg (271 lb)   SpO2 100%   BMI 41.21 kg/m²       Physical Exam  Constitutional:       General: She is not in acute distress.     Appearance: Normal appearance. She is not ill-appearing.   HENT:      Head: Normocephalic and atraumatic.   Cardiovascular:      Rate and Rhythm: Normal rate and regular rhythm.      Heart sounds: Normal heart sounds. No murmur heard.  Pulmonary:      Effort: Pulmonary effort is normal. No respiratory distress.      Breath sounds: Normal breath sounds.   Chest:      Chest wall: No tenderness.   Abdominal:      General: Abdomen is flat. Bowel sounds are normal. There is no distension.      Palpations: Abdomen is soft. There is no mass.      Tenderness: There is no abdominal tenderness. There is no guarding.   Musculoskeletal:         General: No swelling or tenderness. Normal range of motion.      Cervical back: Normal range of motion and neck supple.   Skin:     General: Skin is warm and dry.      Findings: No rash.   Neurological:      General: No focal deficit present.      Mental Status: She is alert and oriented to person, place, and time. Mental status is at baseline.      Gait: Gait normal.   Psychiatric:         Mood and Affect: Mood normal.         Behavior: Behavior normal.         Thought Content: Thought content normal.         Judgment: Judgment normal.           Result Review :                           Assessment and Plan        Diagnoses and all orders for this visit:    1. Class 3 severe obesity due to excess calories without serious comorbidity with body mass index (BMI) of 40.0 to 44.9 in adult " (Primary)  Comments:  encouraged low calorie diet and dialy cardio exercise, will start wegovy today  Orders:  -     Semaglutide-Weight Management (Wegovy) 0.25 MG/0.5ML solution auto-injector; Inject 0.5 mL under the skin into the appropriate area as directed Every 7 (Seven) Days.  Dispense: 2 mL; Refill: 1    2. Other migraine with status migrainosus, not intractable  Comments:  stable on Nurtec 75mg prn, continue              Follow Up     Return in about 1 month (around 4/18/2024).    Patient was given instructions and counseling regarding her condition or for health maintenance advice. Please see specific information pulled into the AVS if appropriate.     Shayna Abreu  reports that she quit smoking about 9 years ago. Her smoking use included cigarettes. She started smoking about 24 years ago. She has a 15 pack-year smoking history. She has been exposed to tobacco smoke. She has never used smokeless tobacco.

## 2024-04-11 ENCOUNTER — PATIENT MESSAGE (OUTPATIENT)
Dept: FAMILY MEDICINE CLINIC | Facility: CLINIC | Age: 49
End: 2024-04-11
Payer: COMMERCIAL

## 2024-05-13 ENCOUNTER — HOSPITAL ENCOUNTER (OUTPATIENT)
Dept: MAMMOGRAPHY | Facility: HOSPITAL | Age: 49
Discharge: HOME | End: 2024-05-13
Admitting: NURSE PRACTITIONER
Payer: COMMERCIAL

## 2024-05-13 DIAGNOSIS — Z12.31 ENCOUNTER FOR SCREENING MAMMOGRAM FOR MALIGNANT NEOPLASM OF BREAST: ICD-10-CM

## 2024-05-13 DIAGNOSIS — Z00.00 ANNUAL PHYSICAL EXAM: ICD-10-CM

## 2024-05-13 PROCEDURE — 77067 SCR MAMMO BI INCL CAD: CPT

## 2024-05-13 PROCEDURE — 77063 BREAST TOMOSYNTHESIS BI: CPT

## 2024-06-06 ENCOUNTER — OFFICE VISIT (OUTPATIENT)
Dept: ORTHOPEDIC SURGERY | Facility: CLINIC | Age: 49
End: 2024-06-06
Payer: OTHER MISCELLANEOUS

## 2024-06-06 VITALS
BODY MASS INDEX: 40.37 KG/M2 | OXYGEN SATURATION: 97 % | HEART RATE: 60 BPM | SYSTOLIC BLOOD PRESSURE: 142 MMHG | DIASTOLIC BLOOD PRESSURE: 86 MMHG | WEIGHT: 265.5 LBS

## 2024-06-06 DIAGNOSIS — M25.511 RIGHT SHOULDER PAIN, UNSPECIFIED CHRONICITY: Primary | ICD-10-CM

## 2024-06-06 DIAGNOSIS — S49.91XA INJURY OF RIGHT SHOULDER, INITIAL ENCOUNTER: ICD-10-CM

## 2024-06-06 RX ORDER — DICLOFENAC SODIUM 75 MG/1
75 TABLET, DELAYED RELEASE ORAL 2 TIMES DAILY
Qty: 60 TABLET | Refills: 1 | Status: SHIPPED | OUTPATIENT
Start: 2024-06-06

## 2024-06-06 NOTE — PROGRESS NOTES
Chief Complaint  Initial Evaluation of the Right Shoulder     Subjective      Shayna Abreu presents to [facility] for initial evaluation of the right shoulder. She is having pain since  when she had a fall.  She thought the pain would get better and it has just gotten worse.  She has decrease ROM and pain.  She had an X ray and is here to review.  She has noted some tingling of her right hand at time.  She has pain with sleeping.  She has been doing exercises at home.  Prior to the injury she had full ROM of the shoulder. This is a workers compensation case.     Allergies   Allergen Reactions    Penicillins Hives        Social History     Socioeconomic History    Marital status:    Tobacco Use    Smoking status: Former     Current packs/day: 0.00     Average packs/day: 1 pack/day for 15.0 years (15.0 ttl pk-yrs)     Types: Cigarettes     Start date: 1999     Quit date: 2014     Years since quittin.6     Passive exposure: Past    Smokeless tobacco: Never   Vaping Use    Vaping status: Never Used   Substance and Sexual Activity    Alcohol use: Never    Drug use: Never    Sexual activity: Defer        I reviewed the patient's chief complaint, history of present illness, review of systems, past medical history, surgical history, family history, social history, medications, and allergy list.     Review of Systems     Constitutional: Denies fevers, chills, weight loss  Cardiovascular: Denies chest pain, shortness of breath  Skin: Denies rashes, acute skin changes  Neurologic: Denies headache, loss of consciousness      Vital Signs:   /86   Pulse 60   Wt 120 kg (265 lb 8 oz)   SpO2 97%   BMI 40.37 kg/m²          Physical Exam  General: Alert. No acute distress    Ortho Exam        RIGHT SHOULDER Forward flexion 70 AROM and 130 AAROM. Abduction 80. External rotation 60. Internal rotation back pocket. Positive Cross body adduction. Supraspinatus  strength 4/5. Infraspinatus Strength 4/5. Infrared subscap 4/5. Negative Putnam. Negative Neer. Negative Apprehension. Negative Lift off. (Negative Obriens. Sensation intact to light touch, median, radial, ulnar nerve. Positive AIN, PIN, ulnar nerve motor. Positive pulses. Negative Impingement signs. Good strength in triceps, biceps, deltoid, wrist extensors and wrist flexors. Tender to palpation to the anterior aspect of the shoulder and down the arm.  Positive Drop arm and Empty Can Test.       Procedures      Imaging Results (Most Recent)       None             Result Review :         XR Shoulder 2+ View Right    Result Date: 6/3/2024  Narrative: XR SHOULDER 2+ VW RIGHT-  Date of Exam: 6/3/2024 9:28 AM  Indication: work injury; M25.511-Pain in right shoulder  Comparison: None available.  Findings: No acute fracture or dislocation is noted. No significant degenerative changes at the AC joint or glenohumeral joint. Minimal spurring off the inferior aspect of the acromion. No focal soft tissue swelling. Limited image of the chest grossly unremarkable      Impression: Impression: No acute osseous abnormality   Electronically Signed By-Jamar Ramos On:6/3/2024 9:52 AM            Assessment and Plan     Diagnoses and all orders for this visit:    1. Right shoulder pain, unspecified chronicity (Primary)    2. Injury of right shoulder, initial encounter        Discussed the treatment plan with the patient. I reviewed the X-rays that were obtained 6/3/24 with the patient.     MRI of the right shoulder to assess the structure.      Prescribed anti inflammatory.     Call or return if worsening symptoms.    Follow Up     After MRI of the right shoulder.       Patient was given instructions and counseling regarding her condition or for health maintenance advice. Please see specific information pulled into the AVS if appropriate.     Scribed for Dulce Sotelo MD by Amber Ni MA.  06/06/24   10:58 EDT    I have  personally performed the services described in this document as scribed by the above individual and it is both accurate and complete. Dulce Sotelo MD 06/06/24

## 2024-06-19 DIAGNOSIS — S49.91XA INJURY OF RIGHT SHOULDER, INITIAL ENCOUNTER: ICD-10-CM

## 2024-06-19 DIAGNOSIS — M25.511 RIGHT SHOULDER PAIN, UNSPECIFIED CHRONICITY: ICD-10-CM

## 2024-06-27 ENCOUNTER — OFFICE VISIT (OUTPATIENT)
Dept: ORTHOPEDIC SURGERY | Facility: CLINIC | Age: 49
End: 2024-06-27
Payer: OTHER MISCELLANEOUS

## 2024-06-27 VITALS
BODY MASS INDEX: 40.09 KG/M2 | HEART RATE: 65 BPM | HEIGHT: 68 IN | OXYGEN SATURATION: 94 % | SYSTOLIC BLOOD PRESSURE: 134 MMHG | DIASTOLIC BLOOD PRESSURE: 82 MMHG | WEIGHT: 264.55 LBS

## 2024-06-27 DIAGNOSIS — S49.91XA INJURY OF RIGHT SHOULDER, INITIAL ENCOUNTER: ICD-10-CM

## 2024-06-27 DIAGNOSIS — M25.511 RIGHT SHOULDER PAIN, UNSPECIFIED CHRONICITY: Primary | ICD-10-CM

## 2024-06-27 DIAGNOSIS — S46.011D TRAUMATIC INCOMPLETE TEAR OF RIGHT ROTATOR CUFF, SUBSEQUENT ENCOUNTER: ICD-10-CM

## 2024-06-27 RX ORDER — TRIAMCINOLONE ACETONIDE 40 MG/ML
40 INJECTION, SUSPENSION INTRA-ARTICULAR; INTRAMUSCULAR
Status: COMPLETED | OUTPATIENT
Start: 2024-06-27 | End: 2024-06-27

## 2024-06-27 RX ORDER — DICLOFENAC SODIUM 75 MG/1
75 TABLET, DELAYED RELEASE ORAL 2 TIMES DAILY
Qty: 60 TABLET | Refills: 1 | Status: SHIPPED | OUTPATIENT
Start: 2024-06-27

## 2024-06-27 RX ORDER — LIDOCAINE HYDROCHLORIDE 10 MG/ML
5 INJECTION, SOLUTION INFILTRATION; PERINEURAL
Status: COMPLETED | OUTPATIENT
Start: 2024-06-27 | End: 2024-06-27

## 2024-06-27 RX ADMIN — TRIAMCINOLONE ACETONIDE 40 MG: 40 INJECTION, SUSPENSION INTRA-ARTICULAR; INTRAMUSCULAR at 09:14

## 2024-06-27 RX ADMIN — LIDOCAINE HYDROCHLORIDE 5 ML: 10 INJECTION, SOLUTION INFILTRATION; PERINEURAL at 09:14

## 2024-06-27 NOTE — PROGRESS NOTES
"Chief Complaint  Pain and Follow-up of the Right Shoulder    Subjective      Shayna bAreu presents to [facility] for follow up of her right shoulder.  Patient started having some right shoulder pain on March 26, 2024 after a fall.  Patient was given an anti-inflammatory and had an MRI of the right shoulder ordered.  Patient is here today to review the right shoulder MRI.  She does report that she has been taking the diclofenac twice daily and it has helped with pain.  She still experiencing some tingling in her whole hand that was noticeable right after the fall.  She says that it is intermittent but still fairly persistent.  She states that she is still working at this time and does not have anything at her job that requires restrictions currently.  This is a workers comp case.    Allergies   Allergen Reactions    Penicillins Hives       Objective     Vital Signs:   Vitals:    06/27/24 0825   BP: 134/82   Pulse: 65   SpO2: 94%   Weight: 120 kg (264 lb 8.8 oz)   Height: 172.7 cm (68\")     Body mass index is 40.22 kg/m².    I reviewed the patient's chief complaint, history of present illness, review of systems, past medical history, surgical history, family history, social history, medications, and allergy list.     REVIEW OF SYSTEMS    Constitutional: Denies fevers, chills, weight loss  Cardiovascular: Denies chest pain, shortness of breath  Skin: Denies rashes, acute skin changes  Neurologic: Denies headache, loss of consciousness  MSK: Right shoulder pain.     Ortho Exam  Shoulder   General: Alert. No acute distress.  Right upper Extremity: 110 degrees active elevation.  120 degrees active shoulder abduction.  External rotation to 60 degrees. Internal rotation to mid lumbar spine.  4/5 resisted shoulder abduction. 5/5 supraspinatus muscle testing with pain. 5/5 subscapularis muscle testing.  4/5 rotator cuff tendon testing. 90 degrees pronation and supination. Demonstrates intact " active elbow ROM. Demonstrates intact active wrist ROM. Sensation intact. Palpable radial pulse. Neurovascularly intact.      Large Joint Arthrocentesis: R subacromial bursa  Date/Time: 6/27/2024 9:14 AM  Consent given by: patient  Site marked: site marked  Timeout: Immediately prior to procedure a time out was called to verify the correct patient, procedure, equipment, support staff and site/side marked as required   Supporting Documentation  Indications: pain   Procedure Details  Location: shoulder - R subacromial bursa  Preparation: Patient was prepped and draped in the usual sterile fashion  Needle gauge: 21 G.  Approach: posterior  Medications administered: 5 mL lidocaine 1 %; 40 mg triamcinolone acetonide 40 MG/ML  Patient tolerance: patient tolerated the procedure well with no immediate complications       This injection documentation was Scribed for SUKUMAR Trevizo by Saniya Trinh.  06/27/24   09:14 EDT    Results of the right shoulder MRI showed diffuse rotator cuff tendinosis.  High-grade articular surface partial tear likely with a perforating full-thickness component affecting the supraspinatus.  This involves nearly the entire depth of the entire tendon.  Likely some low-grade additional articular surface intrasubstance partial tearing of the conjoined fibers of the infraspinatus.  Very mild acromioclavicular osteoarthrosis with minimal underlying bursal distention.  Correlate for symptoms of impingement.  Addition of some tendinosis of the intra-articular long head biceps tendon.       Assessment and Plan   Diagnoses and all orders for this visit:    1. Right shoulder pain, unspecified chronicity (Primary)  -     Large Joint Arthrocentesis: R subacromial bursa  -     diclofenac (VOLTAREN) 75 MG EC tablet; Take 1 tablet by mouth 2 (Two) Times a Day.  Dispense: 60 tablet; Refill: 1    2. Traumatic incomplete tear of right rotator cuff, subsequent encounter  -     Large Joint Arthrocentesis: R  subacromial bursa    3. Injury of right shoulder, initial encounter  -     diclofenac (VOLTAREN) 75 MG EC tablet; Take 1 tablet by mouth 2 (Two) Times a Day.  Dispense: 60 tablet; Refill: 1         Shayna Abreu presents to [facility] for her right shoulder.  MRI was reviewed.  Patient does have partial rotator cuff tear    We discussed further conservative management for their right shoulder. This includes but is not limited to - oral NSAIDs, topical NSAIDs, PT/home exercise program, and intermittent steroid injections.. Patient elected to continue conservative management. Home exercises were provided to patient today. Discussed NSAID use, precautions, and recommendations on taking. Prescription for diclofenac refilled today. Advised not to take with additional NSAIDs (ibuprofen, aleve, naproxen, etc) and to take with food. Patient voiced no known CKD, anti-platelet or anticoagulant use, or GI bleed/ulcer history.     Patient also elected to proceed with right shoulder steroid injection today.  We discussed the risks and benefits with the patient regarding right shoulder steroid injection. Patient understood and elected to proceed.  Right shoulder steroid injection given in office today. Patient tolerated injection well with no complications.     Patient is eligible for repeat steroid injection in 3 months.    Follow-up in 6 weeks. No x-rays needed.         Tobacco Use: Medium Risk (6/27/2024)    Patient History     Smoking Tobacco Use: Former     Smokeless Tobacco Use: Never     Passive Exposure: Past     Patient reports they have a history of tobacco use; encouraged continued tobacco cessation for further health benefits.            Follow Up   Return in about 6 weeks (around 8/8/2024).  There are no Patient Instructions on file for this visit.  Patient was given instructions and counseling regarding her condition or for health maintenance advice. Please see specific information  pulled into the AVS if appropriate.       Dictated Utilizing Dragon Dictation. Please note that portions of this note were completed with a voice recognition program. Part of this note may be an electronic transcription/translation of spoken language to printed text using the Dragon Dictation System.

## 2024-08-08 ENCOUNTER — OFFICE VISIT (OUTPATIENT)
Dept: ORTHOPEDIC SURGERY | Facility: CLINIC | Age: 49
End: 2024-08-08
Payer: OTHER MISCELLANEOUS

## 2024-08-08 VITALS
BODY MASS INDEX: 40.43 KG/M2 | HEIGHT: 68 IN | OXYGEN SATURATION: 98 % | HEART RATE: 81 BPM | SYSTOLIC BLOOD PRESSURE: 125 MMHG | DIASTOLIC BLOOD PRESSURE: 81 MMHG | WEIGHT: 266.76 LBS

## 2024-08-08 DIAGNOSIS — M25.511 RIGHT SHOULDER PAIN, UNSPECIFIED CHRONICITY: Primary | ICD-10-CM

## 2024-08-08 DIAGNOSIS — S49.91XA INJURY OF RIGHT SHOULDER, INITIAL ENCOUNTER: ICD-10-CM

## 2024-08-08 DIAGNOSIS — S46.011D TRAUMATIC INCOMPLETE TEAR OF RIGHT ROTATOR CUFF, SUBSEQUENT ENCOUNTER: ICD-10-CM

## 2024-08-08 RX ORDER — MELOXICAM 15 MG/1
15 TABLET ORAL DAILY
Qty: 30 TABLET | Refills: 0 | Status: SHIPPED | OUTPATIENT
Start: 2024-08-08

## 2024-08-08 NOTE — PROGRESS NOTES
"Chief Complaint  Pain and Follow-up of the Right Shoulder    Subjective      Shayna Abreu presents to [facility] for follow up of her right shoulder.  Patient was last seen in office on 6/27/2024 and was prescribed an anti-inflammatory and given a right shoulder steroid injection.  Patient has a right shoulder rotator cuff tear that we have been treating conservatively.  This is a workers comp case.  She states her previous shoulder steroid injection lasted approximately 3 weeks.  She states it took about a week to kick in and after that she felt pretty good but then it has worn off by now.  She states that last night was really rough and she was having muscle spasms all night.  She states that she has pain mainly along the bicep tendon area.  She denies any further injury that she knows of.  She does still have good range of motion.    Allergies   Allergen Reactions    Penicillins Hives       Objective     Vital Signs:   Vitals:    08/08/24 0827   BP: 125/81   Pulse: 81   SpO2: 98%   Weight: 121 kg (266 lb 12.1 oz)   Height: 172.7 cm (68\")     Body mass index is 40.56 kg/m².    I reviewed the patient's chief complaint, history of present illness, review of systems, past medical history, surgical history, family history, social history, medications, and allergy list.     REVIEW OF SYSTEMS    Constitutional: Denies fevers, chills, weight loss  Cardiovascular: Denies chest pain, shortness of breath  Skin: Denies rashes, acute skin changes  Neurologic: Denies headache, loss of consciousness  MSK: Right shoulder pain.     Ortho Exam  Shoulder   General: Alert. No acute distress.  Right upper Extremity: 180 degrees active elevation. External rotation to 90 degrees. Internal rotation to back pocket.  Palpable tenderness along bicipital groove.  Demonstrates intact active elbow ROM. Demonstrates intact active wrist ROM. Sensation intact. Palpable radial pulse. Neurovascularly intact.        "     Imaging Results (Most Recent)       None                Assessment and Plan   Diagnoses and all orders for this visit:    1. Right shoulder pain, unspecified chronicity (Primary)  -     meloxicam (Mobic) 15 MG tablet; Take 1 tablet by mouth Daily.  Dispense: 30 tablet; Refill: 0    2. Traumatic incomplete tear of right rotator cuff, subsequent encounter  -     meloxicam (Mobic) 15 MG tablet; Take 1 tablet by mouth Daily.  Dispense: 30 tablet; Refill: 0    3. Injury of right shoulder, initial encounter  -     meloxicam (Mobic) 15 MG tablet; Take 1 tablet by mouth Daily.  Dispense: 30 tablet; Refill: 0         Shayna Abreu presents to [facility] for her right shoulder.  Patient has a right shoulder rotator cuff tear that we have been treating conservatively.  We will adjust her anti-inflammatory to meloxicam daily.  Zynex e-stim unit was ordered today.  Discussed her continued home exercise program at this time.  We did briefly discussed rotator cuff repair surgical procedure however patient is not ready to proceed with that at this time.  Patient is eligible for repeat right shoulder steroid injection in 6 weeks.  Continue to ice as needed for pain.    Patient follow-up in 6 weeks for repeat right shoulder steroid injection.      Tobacco Use: Medium Risk (8/8/2024)    Patient History     Smoking Tobacco Use: Former     Smokeless Tobacco Use: Never     Passive Exposure: Past     Patient reports they have a history of tobacco use; encouraged continued tobacco cessation for further health benefits.            Follow Up   Return in about 6 weeks (around 9/19/2024).  There are no Patient Instructions on file for this visit.  Patient was given instructions and counseling regarding her condition or for health maintenance advice. Please see specific information pulled into the AVS if appropriate.       Dictated Utilizing Dragon Dictation. Please note that portions of this note were  completed with a voice recognition program. Part of this note may be an electronic transcription/translation of spoken language to printed text using the Dragon Dictation System.

## 2024-08-29 ENCOUNTER — TELEPHONE (OUTPATIENT)
Dept: ORTHOPEDIC SURGERY | Facility: CLINIC | Age: 49
End: 2024-08-29

## 2024-09-04 DIAGNOSIS — S46.011D TRAUMATIC INCOMPLETE TEAR OF RIGHT ROTATOR CUFF, SUBSEQUENT ENCOUNTER: ICD-10-CM

## 2024-09-04 DIAGNOSIS — M25.511 RIGHT SHOULDER PAIN, UNSPECIFIED CHRONICITY: ICD-10-CM

## 2024-09-04 DIAGNOSIS — S49.91XA INJURY OF RIGHT SHOULDER, INITIAL ENCOUNTER: ICD-10-CM

## 2024-09-04 RX ORDER — MELOXICAM 15 MG/1
15 TABLET ORAL DAILY
Qty: 30 TABLET | Refills: 0 | Status: SHIPPED | OUTPATIENT
Start: 2024-09-04

## 2024-09-19 ENCOUNTER — OFFICE VISIT (OUTPATIENT)
Dept: ORTHOPEDIC SURGERY | Facility: CLINIC | Age: 49
End: 2024-09-19
Payer: OTHER MISCELLANEOUS

## 2024-09-19 VITALS — SYSTOLIC BLOOD PRESSURE: 133 MMHG | HEART RATE: 74 BPM | OXYGEN SATURATION: 97 % | DIASTOLIC BLOOD PRESSURE: 80 MMHG

## 2024-09-19 DIAGNOSIS — S46.011D TRAUMATIC INCOMPLETE TEAR OF RIGHT ROTATOR CUFF, SUBSEQUENT ENCOUNTER: ICD-10-CM

## 2024-09-19 DIAGNOSIS — M25.511 RIGHT SHOULDER PAIN, UNSPECIFIED CHRONICITY: Primary | ICD-10-CM

## 2024-09-19 DIAGNOSIS — S49.91XD INJURY OF RIGHT SHOULDER, SUBSEQUENT ENCOUNTER: ICD-10-CM

## 2024-09-19 RX ORDER — TRIAMCINOLONE ACETONIDE 40 MG/ML
40 INJECTION, SUSPENSION INTRA-ARTICULAR; INTRAMUSCULAR
Status: COMPLETED | OUTPATIENT
Start: 2024-09-19 | End: 2024-09-19

## 2024-09-19 RX ORDER — LIDOCAINE HYDROCHLORIDE 10 MG/ML
5 INJECTION, SOLUTION INFILTRATION; PERINEURAL
Status: COMPLETED | OUTPATIENT
Start: 2024-09-19 | End: 2024-09-19

## 2024-09-19 RX ADMIN — LIDOCAINE HYDROCHLORIDE 5 ML: 10 INJECTION, SOLUTION INFILTRATION; PERINEURAL at 08:38

## 2024-09-19 RX ADMIN — TRIAMCINOLONE ACETONIDE 40 MG: 40 INJECTION, SUSPENSION INTRA-ARTICULAR; INTRAMUSCULAR at 08:38

## 2024-10-03 DIAGNOSIS — M25.511 RIGHT SHOULDER PAIN, UNSPECIFIED CHRONICITY: ICD-10-CM

## 2024-10-03 DIAGNOSIS — S46.011D TRAUMATIC INCOMPLETE TEAR OF RIGHT ROTATOR CUFF, SUBSEQUENT ENCOUNTER: ICD-10-CM

## 2024-10-03 DIAGNOSIS — S49.91XA INJURY OF RIGHT SHOULDER, INITIAL ENCOUNTER: ICD-10-CM

## 2024-10-03 RX ORDER — MELOXICAM 15 MG/1
15 TABLET ORAL DAILY
Qty: 30 TABLET | Refills: 0 | Status: SHIPPED | OUTPATIENT
Start: 2024-10-03

## 2024-10-31 ENCOUNTER — OFFICE VISIT (OUTPATIENT)
Dept: ORTHOPEDIC SURGERY | Facility: CLINIC | Age: 49
End: 2024-10-31
Payer: OTHER MISCELLANEOUS

## 2024-10-31 VITALS
SYSTOLIC BLOOD PRESSURE: 126 MMHG | BODY MASS INDEX: 40.43 KG/M2 | WEIGHT: 266.76 LBS | HEART RATE: 90 BPM | DIASTOLIC BLOOD PRESSURE: 84 MMHG | HEIGHT: 68 IN | OXYGEN SATURATION: 98 %

## 2024-10-31 DIAGNOSIS — S49.91XD INJURY OF RIGHT SHOULDER, SUBSEQUENT ENCOUNTER: ICD-10-CM

## 2024-10-31 DIAGNOSIS — S46.011D TRAUMATIC INCOMPLETE TEAR OF RIGHT ROTATOR CUFF, SUBSEQUENT ENCOUNTER: ICD-10-CM

## 2024-10-31 DIAGNOSIS — M25.511 RIGHT SHOULDER PAIN, UNSPECIFIED CHRONICITY: Primary | ICD-10-CM

## 2024-10-31 NOTE — PROGRESS NOTES
"Chief Complaint  Pain and Follow-up of the Right Shoulder    Subjective      Shayna Abreu presents to [facility] for follow up of her right shoulder.  Patient has a right shoulder rotator cuff tear that she been trying to treat conservatively.  Patient was last seen in office on 9/19/2024 and received a right shoulder steroid injection.  Patient has also been doing home exercises, utilizing anti-inflammatories intermittently, & e-stim unit/TENS unit.  Today patient states her last injection only lasted approximately 10 to 12 days.  She states that she is frustrated that her injection did not last longer because she is not necessarily ready to consider surgery but she feels like that she stuck at a position where she needs to have surgery done in order to improve.  She states that she feels the stiffness and pain within the shoulder constantly.  She states she still has difficulty with certain range of motion exercises she does have decent movement.    Allergies   Allergen Reactions    Penicillins Hives       Objective     Vital Signs:   Vitals:    10/31/24 0825   BP: 126/84   Pulse: 90   SpO2: 98%   Weight: 121 kg (266 lb 12.1 oz)   Height: 172.7 cm (68\")     Body mass index is 40.56 kg/m².    I reviewed the patient's chief complaint, history of present illness, review of systems, past medical history, surgical history, family history, social history, medications, and allergy list.     REVIEW OF SYSTEMS    Constitutional: Denies fevers, chills, weight loss  Cardiovascular: Denies chest pain, shortness of breath  Skin: Denies rashes, acute skin changes  Neurologic: Denies headache, loss of consciousness  MSK: Right shoulder pain.     Ortho Exam  Shoulder   General: Alert. No acute distress.  Right upper Extremity: 170 degrees active elevation. External rotation to 55 degrees. Internal rotation to mid thoracic.  Tenderness along tricep and bicipital groove.  Demonstrates intact active " elbow ROM. Demonstrates intact active wrist ROM. Sensation intact. Palpable radial pulse. Neurovascularly intact.            Imaging Results (Most Recent)       None                Assessment and Plan   Diagnoses and all orders for this visit:    1. Right shoulder pain, unspecified chronicity (Primary)    2. Traumatic incomplete tear of right rotator cuff, subsequent encounter    3. Injury of right shoulder, subsequent encounter         Shayna Abreu presents to [facility] for her right shoulder.  Patient has right shoulder pain and a rotator cuff tear that we have been trying to treat conservatively.  Patient last received a right shoulder steroid injection during her last office visit on 9/19/2024.  Patient reports that her pain in the shoulder is not improving and quite frankly worsening and the shots are not lasting long.  Discussed that the patient may need to consider surgical intervention.  Patient will follow-up in approximately 2 to 3 weeks to reevaluate and discuss with Dr. Sotelo about next options including rotator cuff repair surgery.      Tobacco Use: Medium Risk (10/31/2024)    Patient History     Smoking Tobacco Use: Former     Smokeless Tobacco Use: Never     Passive Exposure: Past     Patient reports they have a history of tobacco use; encouraged continued tobacco cessation for further health benefits.            Follow Up   Return in about 2 weeks (around 11/14/2024) for with Dr Sotelo.  There are no Patient Instructions on file for this visit.  Patient was given instructions and counseling regarding her condition or for health maintenance advice. Please see specific information pulled into the AVS if appropriate.       Dictated Utilizing Dragon Dictation. Please note that portions of this note were completed with a voice recognition program. Part of this note may be an electronic transcription/translation of spoken language to printed text using the Dragon Dictation  System.

## 2024-11-14 ENCOUNTER — PREP FOR SURGERY (OUTPATIENT)
Dept: OTHER | Facility: HOSPITAL | Age: 49
End: 2024-11-14
Payer: COMMERCIAL

## 2024-11-14 ENCOUNTER — OFFICE VISIT (OUTPATIENT)
Dept: ORTHOPEDIC SURGERY | Facility: CLINIC | Age: 49
End: 2024-11-14
Payer: OTHER MISCELLANEOUS

## 2024-11-14 ENCOUNTER — TELEPHONE (OUTPATIENT)
Dept: ORTHOPEDIC SURGERY | Facility: CLINIC | Age: 49
End: 2024-11-14

## 2024-11-14 VITALS
OXYGEN SATURATION: 97 % | HEIGHT: 68 IN | HEART RATE: 81 BPM | WEIGHT: 266 LBS | DIASTOLIC BLOOD PRESSURE: 78 MMHG | BODY MASS INDEX: 40.32 KG/M2 | SYSTOLIC BLOOD PRESSURE: 115 MMHG

## 2024-11-14 DIAGNOSIS — S46.011D TRAUMATIC INCOMPLETE TEAR OF RIGHT ROTATOR CUFF, SUBSEQUENT ENCOUNTER: ICD-10-CM

## 2024-11-14 DIAGNOSIS — M25.511 RIGHT SHOULDER PAIN, UNSPECIFIED CHRONICITY: Primary | ICD-10-CM

## 2024-11-14 DIAGNOSIS — S46.011D TRAUMATIC INCOMPLETE TEAR OF RIGHT ROTATOR CUFF, SUBSEQUENT ENCOUNTER: Primary | ICD-10-CM

## 2024-11-14 PROBLEM — S46.011A TRAUMATIC INCOMPLETE TEAR OF RIGHT ROTATOR CUFF: Status: ACTIVE | Noted: 2024-11-14

## 2024-11-14 PROBLEM — M75.101 ROTATOR CUFF TEAR, RIGHT: Status: ACTIVE | Noted: 2024-11-14

## 2024-11-14 NOTE — TELEPHONE ENCOUNTER
PATIENT HAS NOT YET RECD FAX AND WOULD LIKE IT EMAILED IF POSSIBLE    [email or web address]    PLEASE CALL PATIENT IF UNABLE TO EMAIL

## 2024-11-14 NOTE — TELEPHONE ENCOUNTER
Work note scanned to patient's chart along with fax confirmation; both sent to patient via [employer] message.

## 2024-11-14 NOTE — PROGRESS NOTES
Chief Complaint  Follow-up of the Right Shoulder     Subjective      Shayna Abreu presents to [facility] for follow up of her right shoulder. Patient has a right shoulder rotator cuff tear that she been trying to treat conservatively. Patient was last seen in office on 9/19/2024 and received a right shoulder steroid injection.  Patient has also been doing home exercises, utilizing anti-inflammatories intermittently, & e-stim unit/TENS unit.  The patient states her last injection only lasted approximately 10 to 12 days. She has numbness in the 2nd to 4 th digits of the right hand and has burning down the arm and difficulty with sleeping. This is a workers compensation case where she had a fall 3/26/24 at work.  Prior to the injury she had no pain in the shoulder.  She has tried conservative measures as injections,  home exercises and anti inflammatory.     Allergies   Allergen Reactions    Penicillins Hives        Social History     Socioeconomic History    Marital status:    Tobacco Use    Smoking status: Former     Current packs/day: 0.00     Average packs/day: 1 pack/day for 15.0 years (15.0 ttl pk-yrs)     Types: Cigarettes     Start date: 11/1/1999     Quit date: 11/1/2014     Years since quitting: 10.0     Passive exposure: Past    Smokeless tobacco: Never   Vaping Use    Vaping status: Never Used   Substance and Sexual Activity    Alcohol use: Never    Drug use: Never    Sexual activity: Defer        I reviewed the patient's chief complaint, history of present illness, review of systems, past medical history, surgical history, family history, social history, medications, and allergy list.     Review of Systems     Constitutional: Denies fevers, chills, weight loss  Cardiovascular: Denies chest pain, shortness of breath  Skin: Denies rashes, acute skin changes  Neurologic: Denies headache, loss of consciousness      Vital Signs:   /78   Pulse 81   Ht 172.7 cm  "(67.99\")   Wt 121 kg (266 lb)   SpO2 97%   BMI 40.45 kg/m²          Physical Exam  General: Alert. No acute distress    Ortho Exam        Right upper Extremity: 170 degrees active elevation. External rotation to 55 degrees. Internal rotation to mid thoracic.  Tenderness along tricep and bicipital groove Supraspinatus strength 4/5. Infraspinatus Strength 4+/5. Infrared subscap 4+/5. Pain with drop arm and empty can test.. Sensation intact to light touch, median, radial, ulnar nerve. Positive AIN, PIN, ulnar nerve motor. Positive pulses.     Procedures      Imaging Results (Most Recent)       None             Result Review :   MRI 6/18/24 [facility]    PROCEDURE: MRI SHOULDER WO CONTRAST 66856  REASON FOR EXAM: M25.511 RIGHT SHOULDER PAIN fall 3 months ago  COMPARISON: None  TECHNIQUE: Multiplanar unenhanced MR imaging of the right shoulder.  FINDINGS: Bone marrow: Findings suggests reconversion process. No specific or suspicious marrow abnormality.. Soft tissues:  Muscular and fascial planes appear well-maintained. Major neurovascular structures are unremarkable. Acromioclavicular joint:  Anatomically aligned with capsular hypertrophy. No lateral acromial downsloping. Type 2 acromion. Subacromial/subdeltoid  bursa: Mild fluid distention.  Rotator cuff: Severe articular surface partial-thickness tear possible perforating full-thickness component through across the  majority of the supraspinatus. Infraspinatus tendinosis likely with some low-grade intrasubstance partial tearing is suspected as  well. Subscapularis demonstrates signal changes of tendinosis and possibly some low-grade articular surface partial tearing of  leading edge fibers. Teres minor is intact. Long head of biceps tendon: Properly positioned in the bicipital groove with normal  attachment to the bicipital anchor. Intermediate high signal through the intra-articular segment suggesting tendinosis.  Glenohumeral joint: Anatomically aligned. Cartilage " surfaces appear maintained. No sizable joint effusion. Labrum: Within the  confines of a non arthrogram examination, no discrete linear labral tear. Capsular ligaments: Including IGHL appear intact.  IMPRESSION: 1. Diffuse rotator cuff tendinosis. High-grade articular surface partial tear likely with perforating full-thickness  component affecting the supraspinatus. This involves nearly the entire depth of the entire tendon. Likely some low-grade additional  articular surface intrasubstance partial tearing of the conjoined fibers of the infraspinatus. 2. Very mild acromioclavicular  osteoarthrosis with minimal underlying bursal distention. Correlate for symptoms of impingement. 3. Suggestion of some  tendinosis of the intra-articular long head biceps tendon.         Assessment and Plan     Diagnoses and all orders for this visit:    1. Right shoulder pain, unspecified chronicity (Primary)    2. Traumatic incomplete tear of right rotator cuff, subsequent encounter        Discussed the treatment plan with the patient.     Discussed the treatment options with the patient, operative vs non-operative. The patient expressed understanding and wished to proceed with right shoulder arthroscopy.        Discussed surgery., Risks/benefits discussed with patient including, but not limited to: infection, bleeding, neurovascular damage, re-rupture, aesthetic deformity, need for further surgery, and death., Surgery pamphlet given., and Call or return if worsening symptoms.    Follow Up     2 weeks postoperatively       Patient was given instructions and counseling regarding her condition or for health maintenance advice. Please see specific information pulled into the AVS if appropriate.     Scribed for Dulce Sotelo MD by Amber Ni MA.  11/14/24   08:17 EST    I have personally performed the services described in this document as scribed by the above individual and it is both accurate and complete. Dulce Sotelo MD  11/14/24

## 2024-11-14 NOTE — TELEPHONE ENCOUNTER
"Caller: Shayna Abreu \"Fanny\"    Relationship: Self    Best call back number: [phone]    What form or medical record are you requesting: WORK STATUS    Who is requesting this form or medical record from you: WORK COMP    How would you like to receive the form or medical records (pick-up, mail, fax): FAX  If fax, what is the fax number: [phone]    Timeframe paperwork needed: ASAP    Additional notes:  NO RESTRICTIONS  "

## 2024-11-14 NOTE — TELEPHONE ENCOUNTER
"      Hub staff attempted to follow warm transfer process and was unsuccessful     Caller: Shayna Abreu \"Fanny\"    Relationship to patient: Self    Best call back number: [phone] (home) [phone] (work)      Patient is needing: I ADVISED PATIENT OF 24 HR TAT FOR REQUESTS.  PATIENT WOULD LIKE FOR THE WORK NOTE TO BE UPLOADED TO HER [employer] SO SHE CAN PRINT IT OFF AND FORWARD.     PATIENT WAS SEEN IN OFFICE TODAY 11/14/2024        "

## 2024-11-16 DIAGNOSIS — M25.511 RIGHT SHOULDER PAIN, UNSPECIFIED CHRONICITY: ICD-10-CM

## 2024-11-16 DIAGNOSIS — S46.011D TRAUMATIC INCOMPLETE TEAR OF RIGHT ROTATOR CUFF, SUBSEQUENT ENCOUNTER: ICD-10-CM

## 2024-11-16 DIAGNOSIS — S49.91XA INJURY OF RIGHT SHOULDER, INITIAL ENCOUNTER: ICD-10-CM

## 2024-11-18 ENCOUNTER — TELEPHONE (OUTPATIENT)
Dept: ORTHOPEDIC SURGERY | Facility: CLINIC | Age: 49
End: 2024-11-18

## 2024-11-18 RX ORDER — MELOXICAM 15 MG/1
15 TABLET ORAL DAILY
Qty: 30 TABLET | Refills: 0 | Status: SHIPPED | OUTPATIENT
Start: 2024-11-18

## 2024-11-18 NOTE — TELEPHONE ENCOUNTER
Caller: LISA DAVID    Relationship: NURSE  FOR [employer]- W/C    Best call back number: [phone]    What form or medical record are you requesting: -OFFICE NOTES FOR DATE OF SERVICE 11.14.24- AND SURGERY ORDERS    Who is requesting this form or medical record from you: W/C [employer]ER - [employer]     How would you like to receive the form or medical records (pick-up, mail, fax):       FAX#- [phone]    Timeframe paperwork needed: ASAP    Additional notes: SPOKE WITH LISA- SHE IS REQUESTING OFFICE NOTES FOR DATE OF SERVICE 11.14.24- SHE ALSO NEEDS THE SURGERY ORDERS FOR THIS PT.    PLEASE CONTACT LISA AND ADVISE

## 2024-12-04 ENCOUNTER — TELEPHONE (OUTPATIENT)
Dept: ORTHOPEDIC SURGERY | Facility: CLINIC | Age: 49
End: 2024-12-04
Payer: COMMERCIAL

## 2024-12-04 NOTE — TELEPHONE ENCOUNTER
Caller: LISA WITH [employer]    Relationship to patient:     Best call back number: [phone]    Type of visit: WOULD LIKE A CALL TO SEE IF SX HAS BEEN SCHEDULED-

## 2024-12-12 ENCOUNTER — OFFICE VISIT (OUTPATIENT)
Dept: FAMILY MEDICINE CLINIC | Facility: CLINIC | Age: 49
End: 2024-12-12
Payer: COMMERCIAL

## 2024-12-12 ENCOUNTER — LAB (OUTPATIENT)
Dept: LAB | Facility: HOSPITAL | Age: 49
End: 2024-12-12
Payer: COMMERCIAL

## 2024-12-12 VITALS
HEIGHT: 68 IN | HEART RATE: 60 BPM | SYSTOLIC BLOOD PRESSURE: 140 MMHG | DIASTOLIC BLOOD PRESSURE: 57 MMHG | WEIGHT: 263 LBS | OXYGEN SATURATION: 96 % | BODY MASS INDEX: 39.86 KG/M2

## 2024-12-12 DIAGNOSIS — Z23 NEED FOR INFLUENZA VACCINATION: ICD-10-CM

## 2024-12-12 DIAGNOSIS — Z00.00 ANNUAL PHYSICAL EXAM: Primary | ICD-10-CM

## 2024-12-12 DIAGNOSIS — G43.801 OTHER MIGRAINE WITH STATUS MIGRAINOSUS, NOT INTRACTABLE: ICD-10-CM

## 2024-12-12 DIAGNOSIS — M19.90 ARTHRITIS: ICD-10-CM

## 2024-12-12 DIAGNOSIS — Z00.00 ANNUAL PHYSICAL EXAM: ICD-10-CM

## 2024-12-12 DIAGNOSIS — Z13.220 SCREENING FOR CHOLESTEROL LEVEL: ICD-10-CM

## 2024-12-12 DIAGNOSIS — Z13.29 SCREENING FOR THYROID DISORDER: ICD-10-CM

## 2024-12-12 LAB
ALBUMIN SERPL-MCNC: 3.9 G/DL (ref 3.5–5.2)
ALBUMIN/GLOB SERPL: 1 G/DL
ALP SERPL-CCNC: 94 U/L (ref 39–117)
ALT SERPL W P-5'-P-CCNC: 12 U/L (ref 1–33)
ANION GAP SERPL CALCULATED.3IONS-SCNC: 9 MMOL/L (ref 5–15)
AST SERPL-CCNC: 17 U/L (ref 1–32)
BASOPHILS # BLD AUTO: 0.06 10*3/MM3 (ref 0–0.2)
BASOPHILS NFR BLD AUTO: 0.7 % (ref 0–1.5)
BILIRUB SERPL-MCNC: 0.4 MG/DL (ref 0–1.2)
BUN SERPL-MCNC: 16 MG/DL (ref 6–20)
BUN/CREAT SERPL: 16.7 (ref 7–25)
CALCIUM SPEC-SCNC: 9.7 MG/DL (ref 8.6–10.5)
CHLORIDE SERPL-SCNC: 104 MMOL/L (ref 98–107)
CHOLEST SERPL-MCNC: 186 MG/DL (ref 0–200)
CO2 SERPL-SCNC: 24 MMOL/L (ref 22–29)
CREAT SERPL-MCNC: 0.96 MG/DL (ref 0.57–1)
DEPRECATED RDW RBC AUTO: 39.7 FL (ref 37–54)
EGFRCR SERPLBLD CKD-EPI 2021: 74 ML/MIN/1.73
EOSINOPHIL # BLD AUTO: 0.18 10*3/MM3 (ref 0–0.4)
EOSINOPHIL NFR BLD AUTO: 2.2 % (ref 0.3–6.2)
ERYTHROCYTE [DISTWIDTH] IN BLOOD BY AUTOMATED COUNT: 12.1 % (ref 12.3–15.4)
GLOBULIN UR ELPH-MCNC: 3.8 GM/DL
GLUCOSE SERPL-MCNC: 89 MG/DL (ref 65–99)
HCT VFR BLD AUTO: 41.6 % (ref 34–46.6)
HDLC SERPL-MCNC: 56 MG/DL (ref 40–60)
HGB BLD-MCNC: 13 G/DL (ref 12–15.9)
IMM GRANULOCYTES # BLD AUTO: 0.02 10*3/MM3 (ref 0–0.05)
IMM GRANULOCYTES NFR BLD AUTO: 0.2 % (ref 0–0.5)
LDLC SERPL CALC-MCNC: 114 MG/DL (ref 0–100)
LDLC/HDLC SERPL: 2 {RATIO}
LYMPHOCYTES # BLD AUTO: 2.25 10*3/MM3 (ref 0.7–3.1)
LYMPHOCYTES NFR BLD AUTO: 27.2 % (ref 19.6–45.3)
MCH RBC QN AUTO: 28.5 PG (ref 26.6–33)
MCHC RBC AUTO-ENTMCNC: 31.3 G/DL (ref 31.5–35.7)
MCV RBC AUTO: 91.2 FL (ref 79–97)
MONOCYTES # BLD AUTO: 0.48 10*3/MM3 (ref 0.1–0.9)
MONOCYTES NFR BLD AUTO: 5.8 % (ref 5–12)
NEUTROPHILS NFR BLD AUTO: 5.27 10*3/MM3 (ref 1.7–7)
NEUTROPHILS NFR BLD AUTO: 63.9 % (ref 42.7–76)
NRBC BLD AUTO-RTO: 0 /100 WBC (ref 0–0.2)
PLATELET # BLD AUTO: 337 10*3/MM3 (ref 140–450)
PMV BLD AUTO: 10.7 FL (ref 6–12)
POTASSIUM SERPL-SCNC: 4.5 MMOL/L (ref 3.5–5.2)
PROT SERPL-MCNC: 7.7 G/DL (ref 6–8.5)
RBC # BLD AUTO: 4.56 10*6/MM3 (ref 3.77–5.28)
SODIUM SERPL-SCNC: 137 MMOL/L (ref 136–145)
TRIGL SERPL-MCNC: 89 MG/DL (ref 0–150)
TSH SERPL DL<=0.05 MIU/L-ACNC: 2.16 UIU/ML (ref 0.27–4.2)
VLDLC SERPL-MCNC: 16 MG/DL (ref 5–40)
WBC NRBC COR # BLD AUTO: 8.26 10*3/MM3 (ref 3.4–10.8)

## 2024-12-12 PROCEDURE — 99214 OFFICE O/P EST MOD 30 MIN: CPT | Performed by: NURSE PRACTITIONER

## 2024-12-12 PROCEDURE — 80061 LIPID PANEL: CPT

## 2024-12-12 PROCEDURE — 90656 IIV3 VACC NO PRSV 0.5 ML IM: CPT | Performed by: NURSE PRACTITIONER

## 2024-12-12 PROCEDURE — 80050 GENERAL HEALTH PANEL: CPT

## 2024-12-12 PROCEDURE — 36415 COLL VENOUS BLD VENIPUNCTURE: CPT

## 2024-12-12 PROCEDURE — 90471 IMMUNIZATION ADMIN: CPT | Performed by: NURSE PRACTITIONER

## 2024-12-12 PROCEDURE — 99396 PREV VISIT EST AGE 40-64: CPT | Performed by: NURSE PRACTITIONER

## 2024-12-12 RX ORDER — RIZATRIPTAN BENZOATE 5 MG/1
10 TABLET, ORALLY DISINTEGRATING ORAL ONCE AS NEEDED
Qty: 12 TABLET | Refills: 1 | Status: SHIPPED | OUTPATIENT
Start: 2024-12-12

## 2024-12-12 NOTE — PROGRESS NOTES
Chief Complaint  Annual Exam and Migraine, arthrtis    Subjective            Shaynasa RELL Abreu presents to [facility]  History of Present Illness  Pt is here for an annual CPE for f/u on  arthritis and migraines. Pt states the nurtec is no longer working for her. Pt states she has to take additional OTC medication after taking the nurtec. Pt states she gets 1-2 migraines per month. Pt has not tried any other medication. No other issues or concerns at this time.     Pt is due labs/orders placed.    Pap: 2/7/23    Mammogram: 5/13/24    Colon: 7/31/23, repeat in 10 years 10 yrs.    EGD: 7/31/23, every 2 yrs, will  be due in 2025    Pt is due flu vaccine. Pt will get today in clinic.         Past Medical History:   Diagnosis Date    Headache     Migraines        Allergies   Allergen Reactions    Penicillins Hives        Past Surgical History:   Procedure Laterality Date    CHOLECYSTECTOMY  removed approx 14 yrs ago    COLONOSCOPY N/A 7/31/2023    Procedure: COLONOSCOPY;  Surgeon: Hany Guajardo MD;  Location: [facility] END[facility];  Service: General;  Laterality: N/A;  NORMAL    ENDOSCOPY N/A 7/31/2023    Procedure: ESOPHAGOGASTRODUODENOSCOPY;  Surgeon: Hany Guajardo MD;  Location: [facility] ENDOSCOPY;  Service: General;  Laterality: N/A;  NORMAL    TEMPOROMANDIBULAR JOINT ARTHROPLASTY      TUBAL ABDOMINAL LIGATION  approx 15 yrs ago        Social History     Tobacco Use    Smoking status: Former     Current packs/day: 0.00     Average packs/day: 1 pack/day for 15.0 years (15.0 ttl pk-yrs)     Types: Cigarettes     Start date: 11/1/1999     Quit date: 11/1/2014     Years since quitting: 10.1     Passive exposure: Past    Smokeless tobacco: Never   Substance Use Topics    Alcohol use: Never       Family History   Problem Relation Age of Onset    Asthma Mother     COPD Mother     Diabetes Mother     Cancer Father         ESOPHAGUS & STOMACH    Hearing loss Father     Vision loss Father      "    Current Outpatient Medications on File Prior to Visit   Medication Sig    meloxicam (MOBIC) 15 MG tablet Take 1 tablet by mouth once daily    naltrexone-bupropion ER (CONTRAVE) 8-90 MG tablet     [DISCONTINUED] Rimegepant Sulfate (Nurtec) 75 MG tablet dispersible tablet Take 1 tablet by mouth Daily As Needed (migraine). (Patient not taking: Reported on 12/12/2024)     No current facility-administered medications on file prior to visit.       Health Maintenance Due   Topic Date Due    INFLUENZA VACCINE  07/01/2024       Objective     /57   Pulse 60   Ht 172.7 cm (68\")   Wt 119 kg (263 lb)   SpO2 96%   BMI 39.99 kg/m²       Physical Exam  Constitutional:       General: She is not in acute distress.     Appearance: Normal appearance. She is not ill-appearing.   HENT:      Head: Normocephalic and atraumatic.      Right Ear: Tympanic membrane, ear canal and external ear normal.      Left Ear: Tympanic membrane, ear canal and external ear normal.      Nose: Nose normal.   Cardiovascular:      Rate and Rhythm: Normal rate and regular rhythm.      Heart sounds: Normal heart sounds. No murmur heard.  Pulmonary:      Effort: Pulmonary effort is normal. No respiratory distress.      Breath sounds: Normal breath sounds.   Chest:      Chest wall: No tenderness.   Abdominal:      General: Abdomen is flat. Bowel sounds are normal. There is no distension.      Palpations: Abdomen is soft. There is no mass.      Tenderness: There is no abdominal tenderness. There is no guarding.   Musculoskeletal:         General: No swelling or tenderness. Normal range of motion.      Cervical back: Normal range of motion and neck supple.   Skin:     General: Skin is warm and dry.      Findings: No rash.   Neurological:      General: No focal deficit present.      Mental Status: She is alert and oriented to person, place, and time. Mental status is at baseline.      Gait: Gait normal.   Psychiatric:         Mood and Affect: Mood " normal.         Behavior: Behavior normal.         Thought Content: Thought content normal.         Judgment: Judgment normal.           Result Review :                           Assessment and Plan        Diagnoses and all orders for this visit:    1. Annual physical exam (Primary)  -     CBC w AUTO Differential; Future  -     Comprehensive metabolic panel; Future  -     Lipid panel; Future  -     TSH; Future  -     Fluzone >6mos (8800-8338)    2. Other migraine with status migrainosus, not intractable  Comments:  will switch to Maxalt for better control, adivsed pt to keep a HA diary as well  Orders:  -     rizatriptan MLT (MAXALT-MLT) 5 MG disintegrating tablet; Place 2 tablets on the tongue 1 (One) Time As Needed for Migraine (migraine) for up to 1 dose. May repeat in 2 hours if needed  Dispense: 12 tablet; Refill: 1    3. Arthritis  Comments:  stable on Mobic 15mg, continue    4. Screening for cholesterol level  -     Comprehensive metabolic panel; Future  -     Lipid panel; Future    5. Screening for thyroid disorder  -     TSH; Future    6. Need for influenza vaccination  -     Fluzone >6mos ([id number])      Preventative Counseling:  Healthy diet  Daily exercise  Get adequate sleep        Follow Up     Return in about 3 months (around 3/12/2025).    Patient was given instructions and counseling regarding her condition or for health maintenance advice. Please see specific information pulled into the AVS if appropriate.     Shayna Herrerail  reports that she quit smoking about 10 years ago. Her smoking use included cigarettes. She started smoking about 25 years ago. She has a 15 pack-year smoking history. She has been exposed to tobacco smoke. She has never used smokeless tobacco.        SUKUMAR Steele

## 2024-12-22 DIAGNOSIS — S49.91XA INJURY OF RIGHT SHOULDER, INITIAL ENCOUNTER: ICD-10-CM

## 2024-12-22 DIAGNOSIS — M25.511 RIGHT SHOULDER PAIN, UNSPECIFIED CHRONICITY: ICD-10-CM

## 2024-12-22 DIAGNOSIS — S46.011D TRAUMATIC INCOMPLETE TEAR OF RIGHT ROTATOR CUFF, SUBSEQUENT ENCOUNTER: ICD-10-CM

## 2024-12-23 RX ORDER — MELOXICAM 15 MG/1
15 TABLET ORAL DAILY
Qty: 30 TABLET | Refills: 0 | Status: SHIPPED | OUTPATIENT
Start: 2024-12-23

## 2025-01-08 DIAGNOSIS — M25.511 RIGHT SHOULDER PAIN, UNSPECIFIED CHRONICITY: Primary | ICD-10-CM

## 2025-01-08 DIAGNOSIS — Z47.89 AFTERCARE FOLLOWING SURGERY OF THE MUSCULOSKELETAL SYSTEM: ICD-10-CM

## 2025-01-14 NOTE — PRE-PROCEDURE INSTRUCTIONS
IMPORTANT INSTRUCTIONS - PRE-ADMISSION TESTING  DO NOT EAT, DRINK OR CHEW anything after midnight the night before your procedure.    Take the following medications the morning of your procedure with JUST A SIP OF WATER: NONE    DO NOT BRING your medications to the hospital with you, UNLESS something has changed since your PRE-Admission Testing appointment.  Hold all vitamins, supplements, and NSAIDS (Non- steroidal anti-inflammatory meds) for one week prior to surgery (you MAY take Tylenol or Acetaminophen).  If you are diabetic, check your blood sugar the morning of your procedure. If it is less than 70 or if you are feeling symptomatic, call the following number for further instructions: [phone] [facility]_.  Use your inhalers/nebulizers as usual, the morning of your procedure. BRING YOUR INHALERS with you.   Bring your CPAP or BIPAP to hospital, ONLY IF YOU WILL BE SPENDING THE NIGHT.   Make sure you have a ride home and have someone who will stay with you the day of your procedure after you go home.  If you have any questions, please call your Pre-Admission Testing NurseJE at [phone].   Per anesthesia request, do not smoke for 24 hours before your procedure or as instructed by your surgeon.    SHOWER WITH PRESURGICAL SOAP FOLLOWED BY NOTHING ON SKIN SUCH AS CREAM, LOTION, MAKEUP, JEWELRY, DEODORANT OR POLISH

## 2025-01-15 ENCOUNTER — ANESTHESIA EVENT CONVERTED (OUTPATIENT)
Dept: ANESTHESIOLOGY | Facility: HOSPITAL | Age: 50
End: 2025-01-15
Payer: OTHER MISCELLANEOUS

## 2025-01-15 ENCOUNTER — ANESTHESIA (OUTPATIENT)
Dept: PERIOP | Facility: HOSPITAL | Age: 50
End: 2025-01-15
Payer: OTHER MISCELLANEOUS

## 2025-01-15 ENCOUNTER — HOSPITAL ENCOUNTER (OUTPATIENT)
Facility: HOSPITAL | Age: 50
Discharge: HOME | End: 2025-01-15
Attending: ORTHOPAEDIC SURGERY | Admitting: ORTHOPAEDIC SURGERY
Payer: OTHER MISCELLANEOUS

## 2025-01-15 ENCOUNTER — ANESTHESIA EVENT (OUTPATIENT)
Dept: PERIOP | Facility: HOSPITAL | Age: 50
End: 2025-01-15
Payer: OTHER MISCELLANEOUS

## 2025-01-15 VITALS
HEART RATE: 76 BPM | SYSTOLIC BLOOD PRESSURE: 146 MMHG | OXYGEN SATURATION: 92 % | RESPIRATION RATE: 18 BRPM | HEIGHT: 68 IN | WEIGHT: 269.84 LBS | BODY MASS INDEX: 40.9 KG/M2 | DIASTOLIC BLOOD PRESSURE: 77 MMHG | TEMPERATURE: 97.8 F

## 2025-01-15 DIAGNOSIS — M75.121 COMPLETE TEAR OF RIGHT ROTATOR CUFF, UNSPECIFIED WHETHER TRAUMATIC: Primary | ICD-10-CM

## 2025-01-15 DIAGNOSIS — S46.011D TRAUMATIC INCOMPLETE TEAR OF RIGHT ROTATOR CUFF, SUBSEQUENT ENCOUNTER: ICD-10-CM

## 2025-01-15 PROCEDURE — 25010000002 ONDANSETRON PER 1 MG

## 2025-01-15 PROCEDURE — 25810000003 LACTATED RINGERS PER 1000 ML: Performed by: ANESTHESIOLOGY

## 2025-01-15 PROCEDURE — 25010000002 DEXAMETHASONE PER 1 MG

## 2025-01-15 PROCEDURE — 25010000002 SUGAMMADEX 200 MG/2ML SOLUTION

## 2025-01-15 PROCEDURE — C1713 ANCHOR/SCREW BN/BN,TIS/BN: HCPCS | Performed by: ORTHOPAEDIC SURGERY

## 2025-01-15 PROCEDURE — 29824 SHO ARTHRS SRG DSTL CLAVICLC: CPT | Performed by: ORTHOPAEDIC SURGERY

## 2025-01-15 PROCEDURE — L3670 SO ACRO/CLAV CAN WEB PRE OTS: HCPCS | Performed by: ORTHOPAEDIC SURGERY

## 2025-01-15 PROCEDURE — 25010000002 MIDAZOLAM PER 1MG: Performed by: ANESTHESIOLOGY

## 2025-01-15 PROCEDURE — 25010000002 LIDOCAINE PF 2% 2 % SOLUTION

## 2025-01-15 PROCEDURE — 25010000002 PROPOFOL 10 MG/ML EMULSION

## 2025-01-15 PROCEDURE — 25010000002 ROPIVACAINE PER 1 MG: Performed by: ANESTHESIOLOGY

## 2025-01-15 PROCEDURE — 25010000002 FENTANYL CITRATE (PF) 50 MCG/ML SOLUTION: Performed by: ANESTHESIOLOGY

## 2025-01-15 PROCEDURE — 23412 REPAIR ROTATOR CUFF CHRONIC: CPT | Performed by: ORTHOPAEDIC SURGERY

## 2025-01-15 PROCEDURE — 25010000002 CEFAZOLIN 3 G RECONSTITUTED SOLUTION 1 EACH VIAL: Performed by: ORTHOPAEDIC SURGERY

## 2025-01-15 PROCEDURE — 25010000002 ESMOLOL 100 MG/10ML SOLUTION

## 2025-01-15 DEVICE — IMPLANTABLE DEVICE
Type: IMPLANTABLE DEVICE | Site: ACROMIAL PROCESS | Status: FUNCTIONAL
Brand: QUATTRO® X3 TRIPLE LOADED SUTURE ANCHOR

## 2025-01-15 DEVICE — IMPLANTABLE DEVICE
Type: IMPLANTABLE DEVICE | Site: ACROMIAL PROCESS | Status: FUNCTIONAL
Brand: QUATTRO® LINK KNOTLESS ANCHOR

## 2025-01-15 RX ORDER — PROPOFOL 10 MG/ML
VIAL (ML) INTRAVENOUS AS NEEDED
Status: DISCONTINUED | OUTPATIENT
Start: 2025-01-15 | End: 2025-01-15 | Stop reason: SURG

## 2025-01-15 RX ORDER — ONDANSETRON 2 MG/ML
INJECTION INTRAMUSCULAR; INTRAVENOUS AS NEEDED
Status: DISCONTINUED | OUTPATIENT
Start: 2025-01-15 | End: 2025-01-15 | Stop reason: SURG

## 2025-01-15 RX ORDER — OXYCODONE HYDROCHLORIDE 5 MG/1
5 TABLET ORAL
Status: DISCONTINUED | OUTPATIENT
Start: 2025-01-15 | End: 2025-01-15 | Stop reason: HOSPADM

## 2025-01-15 RX ORDER — MIDAZOLAM HYDROCHLORIDE 2 MG/2ML
2 INJECTION, SOLUTION INTRAMUSCULAR; INTRAVENOUS ONCE
Status: COMPLETED | OUTPATIENT
Start: 2025-01-15 | End: 2025-01-15

## 2025-01-15 RX ORDER — LIDOCAINE HYDROCHLORIDE 20 MG/ML
INJECTION, SOLUTION EPIDURAL; INFILTRATION; INTRACAUDAL; PERINEURAL AS NEEDED
Status: DISCONTINUED | OUTPATIENT
Start: 2025-01-15 | End: 2025-01-15 | Stop reason: SURG

## 2025-01-15 RX ORDER — ROPIVACAINE HYDROCHLORIDE 5 MG/ML
INJECTION, SOLUTION EPIDURAL; INFILTRATION; PERINEURAL
Status: COMPLETED | OUTPATIENT
Start: 2025-01-15 | End: 2025-01-15

## 2025-01-15 RX ORDER — SODIUM CHLORIDE, SODIUM LACTATE, POTASSIUM CHLORIDE, CALCIUM CHLORIDE 600; 310; 30; 20 MG/100ML; MG/100ML; MG/100ML; MG/100ML
9 INJECTION, SOLUTION INTRAVENOUS CONTINUOUS PRN
Status: DISCONTINUED | OUTPATIENT
Start: 2025-01-15 | End: 2025-01-15 | Stop reason: HOSPADM

## 2025-01-15 RX ORDER — PROMETHAZINE HYDROCHLORIDE 12.5 MG/1
25 TABLET ORAL ONCE AS NEEDED
Status: DISCONTINUED | OUTPATIENT
Start: 2025-01-15 | End: 2025-01-15 | Stop reason: HOSPADM

## 2025-01-15 RX ORDER — PROMETHAZINE HYDROCHLORIDE 25 MG/1
25 SUPPOSITORY RECTAL ONCE AS NEEDED
Status: DISCONTINUED | OUTPATIENT
Start: 2025-01-15 | End: 2025-01-15 | Stop reason: HOSPADM

## 2025-01-15 RX ORDER — ACETAMINOPHEN 500 MG
1000 TABLET ORAL ONCE
Status: COMPLETED | OUTPATIENT
Start: 2025-01-15 | End: 2025-01-15

## 2025-01-15 RX ORDER — ONDANSETRON 2 MG/ML
4 INJECTION INTRAMUSCULAR; INTRAVENOUS ONCE AS NEEDED
Status: DISCONTINUED | OUTPATIENT
Start: 2025-01-15 | End: 2025-01-15 | Stop reason: HOSPADM

## 2025-01-15 RX ORDER — MEPERIDINE HYDROCHLORIDE 25 MG/ML
12.5 INJECTION INTRAMUSCULAR; INTRAVENOUS; SUBCUTANEOUS
Status: DISCONTINUED | OUTPATIENT
Start: 2025-01-15 | End: 2025-01-15 | Stop reason: HOSPADM

## 2025-01-15 RX ORDER — SCOLOPAMINE TRANSDERMAL SYSTEM 1 MG/1
1 PATCH, EXTENDED RELEASE TRANSDERMAL ONCE
Status: DISCONTINUED | OUTPATIENT
Start: 2025-01-15 | End: 2025-01-15 | Stop reason: HOSPADM

## 2025-01-15 RX ORDER — ROCURONIUM BROMIDE 10 MG/ML
INJECTION, SOLUTION INTRAVENOUS AS NEEDED
Status: DISCONTINUED | OUTPATIENT
Start: 2025-01-15 | End: 2025-01-15 | Stop reason: SURG

## 2025-01-15 RX ORDER — HYDROCODONE BITARTRATE AND ACETAMINOPHEN 7.5; 325 MG/1; MG/1
1-2 TABLET ORAL EVERY 4 HOURS PRN
Qty: 40 TABLET | Refills: 0 | Status: SHIPPED | OUTPATIENT
Start: 2025-01-15

## 2025-01-15 RX ORDER — ESMOLOL HYDROCHLORIDE 10 MG/ML
INJECTION INTRAVENOUS AS NEEDED
Status: DISCONTINUED | OUTPATIENT
Start: 2025-01-15 | End: 2025-01-15 | Stop reason: SURG

## 2025-01-15 RX ORDER — DEXAMETHASONE SODIUM PHOSPHATE 4 MG/ML
INJECTION, SOLUTION INTRA-ARTICULAR; INTRALESIONAL; INTRAMUSCULAR; INTRAVENOUS; SOFT TISSUE AS NEEDED
Status: DISCONTINUED | OUTPATIENT
Start: 2025-01-15 | End: 2025-01-15 | Stop reason: SURG

## 2025-01-15 RX ORDER — FENTANYL CITRATE 50 UG/ML
100 INJECTION, SOLUTION INTRAMUSCULAR; INTRAVENOUS ONCE
Status: COMPLETED | OUTPATIENT
Start: 2025-01-15 | End: 2025-01-15

## 2025-01-15 RX ORDER — HYDROCODONE BITARTRATE AND ACETAMINOPHEN 7.5; 325 MG/1; MG/1
1 TABLET ORAL ONCE AS NEEDED
Status: DISCONTINUED | OUTPATIENT
Start: 2025-01-15 | End: 2025-01-15 | Stop reason: HOSPADM

## 2025-01-15 RX ADMIN — MIDAZOLAM HYDROCHLORIDE 2 MG: 1 INJECTION, SOLUTION INTRAMUSCULAR; INTRAVENOUS at 09:50

## 2025-01-15 RX ADMIN — DEXAMETHASONE SODIUM PHOSPHATE 4 MG: 4 INJECTION, SOLUTION INTRAMUSCULAR; INTRAVENOUS at 10:56

## 2025-01-15 RX ADMIN — FENTANYL CITRATE 100 MCG: 50 INJECTION, SOLUTION INTRAMUSCULAR; INTRAVENOUS at 09:50

## 2025-01-15 RX ADMIN — ONDANSETRON 4 MG: 2 INJECTION INTRAMUSCULAR; INTRAVENOUS at 10:56

## 2025-01-15 RX ADMIN — SODIUM CHLORIDE, POTASSIUM CHLORIDE, SODIUM LACTATE AND CALCIUM CHLORIDE 9 ML/HR: 600; 310; 30; 20 INJECTION, SOLUTION INTRAVENOUS at 09:33

## 2025-01-15 RX ADMIN — SUGAMMADEX 300 MG: 100 INJECTION, SOLUTION INTRAVENOUS at 12:01

## 2025-01-15 RX ADMIN — ESMOLOL HYDROCHLORIDE 30 MG: 10 INJECTION, SOLUTION INTRAVENOUS at 10:51

## 2025-01-15 RX ADMIN — SODIUM CHLORIDE 3 G: 9 INJECTION, SOLUTION INTRAVENOUS at 10:56

## 2025-01-15 RX ADMIN — PROPOFOL 150 MG: 10 INJECTION, EMULSION INTRAVENOUS at 10:52

## 2025-01-15 RX ADMIN — SCOLOPAMINE TRANSDERMAL SYSTEM 1 PATCH: 1 PATCH, EXTENDED RELEASE TRANSDERMAL at 09:33

## 2025-01-15 RX ADMIN — ACETAMINOPHEN 1000 MG: 500 TABLET ORAL at 09:33

## 2025-01-15 RX ADMIN — ROCURONIUM BROMIDE 50 MG: 50 INJECTION INTRAVENOUS at 10:52

## 2025-01-15 RX ADMIN — LIDOCAINE HYDROCHLORIDE 100 MG: 20 INJECTION, SOLUTION INTRAVENOUS at 10:52

## 2025-01-15 RX ADMIN — ROPIVACAINE HYDROCHLORIDE 20 ML: 5 INJECTION, SOLUTION EPIDURAL; INFILTRATION; PERINEURAL at 09:55

## 2025-01-15 NOTE — H&P
Chief Complaint  No chief complaint on file.     Subjective      Shayna Abreu presents to [facility] for follow up of her right shoulder. Patient has a right shoulder rotator cuff tear that she been trying to treat conservatively. Patient was last seen in office on 9/19/2024 and received a right shoulder steroid injection.  Patient has also been doing home exercises, utilizing anti-inflammatories intermittently, & e-stim unit/TENS unit.  The patient states her last injection only lasted approximately 10 to 12 days. She has numbness in the 2nd to 4 th digits of the right hand and has burning down the arm and difficulty with sleeping. This is a workers compensation case where she had a fall 3/26/24 at work.  Prior to the injury she had no pain in the shoulder.  She has tried conservative measures as injections,  home exercises and anti inflammatory.     Allergies   Allergen Reactions    Penicillins Hives     AS A CHILD        Social History     Socioeconomic History    Marital status:    Tobacco Use    Smoking status: Former     Current packs/day: 0.00     Average packs/day: 1 pack/day for 15.0 years (15.0 ttl pk-yrs)     Types: Cigarettes     Start date: 11/1/1999     Quit date: 11/1/2014     Years since quitting: 10.2     Passive exposure: Past    Smokeless tobacco: Never   Vaping Use    Vaping status: Never Used   Substance and Sexual Activity    Alcohol use: Never    Drug use: Never    Sexual activity: Defer        I reviewed the patient's chief complaint, history of present illness, review of systems, past medical history, surgical history, family history, social history, medications, and allergy list.     Review of Systems     Constitutional: Denies fevers, chills, weight loss  Cardiovascular: Denies chest pain, shortness of breath  Skin: Denies rashes, acute skin changes  Neurologic: Denies headache, loss of consciousness      Vital Signs:   /81 (BP Location: Left arm)   Pulse  "72   Temp 98.1 °F (36.7 °C) (Temporal)   Resp 23   Ht 172.7 cm (68\")   Wt 122 kg (269 lb 13.5 oz)   SpO2 100%   BMI 41.03 kg/m²          Physical Exam  General: Alert. No acute distress    Ortho Exam        Right upper Extremity: 170 degrees active elevation. External rotation to 55 degrees. Internal rotation to mid thoracic.  Tenderness along tricep and bicipital groove Supraspinatus strength 4/5. Infraspinatus Strength 4+/5. Infrared subscap 4+/5. Pain with drop arm and empty can test.. Sensation intact to light touch, median, radial, ulnar nerve. Positive AIN, PIN, ulnar nerve motor. Positive pulses.     Procedures      Imaging Results (Most Recent)       None             Result Review :   MRI 6/18/24 [facility]    PROCEDURE: MRI SHOULDER WO CONTRAST 70046  REASON FOR EXAM: M25.511 RIGHT SHOULDER PAIN fall 3 months ago  COMPARISON: None  TECHNIQUE: Multiplanar unenhanced MR imaging of the right shoulder.  FINDINGS: Bone marrow: Findings suggests reconversion process. No specific or suspicious marrow abnormality.. Soft tissues:  Muscular and fascial planes appear well-maintained. Major neurovascular structures are unremarkable. Acromioclavicular joint:  Anatomically aligned with capsular hypertrophy. No lateral acromial downsloping. Type 2 acromion. Subacromial/subdeltoid  bursa: Mild fluid distention.  Rotator cuff: Severe articular surface partial-thickness tear possible perforating full-thickness component through across the  majority of the supraspinatus. Infraspinatus tendinosis likely with some low-grade intrasubstance partial tearing is suspected as  well. Subscapularis demonstrates signal changes of tendinosis and possibly some low-grade articular surface partial tearing of  leading edge fibers. Teres minor is intact. Long head of biceps tendon: Properly positioned in the bicipital groove with normal  attachment to the bicipital anchor. Intermediate high signal through the intra-articular segment " suggesting tendinosis.  Glenohumeral joint: Anatomically aligned. Cartilage surfaces appear maintained. No sizable joint effusion. Labrum: Within the  confines of a non arthrogram examination, no discrete linear labral tear. Capsular ligaments: Including IGHL appear intact.  IMPRESSION: 1. Diffuse rotator cuff tendinosis. High-grade articular surface partial tear likely with perforating full-thickness  component affecting the supraspinatus. This involves nearly the entire depth of the entire tendon. Likely some low-grade additional  articular surface intrasubstance partial tearing of the conjoined fibers of the infraspinatus. 2. Very mild acromioclavicular  osteoarthrosis with minimal underlying bursal distention. Correlate for symptoms of impingement. 3. Suggestion of some  tendinosis of the intra-articular long head biceps tendon.         Assessment and Plan     Diagnoses and all orders for this visit:    1. Traumatic incomplete tear of right rotator cuff, subsequent encounter  -     Discontinue: ceFAZolin 2000 mg IVPB in 100 mL NS (VTB)  -     ceFAZolin 3000 mg IVPB in 100 mL NS (VTB)    Other orders  -     Outpatient In A Bed; Standing  -     Follow Anesthesia Guidelines / Protocol; Standing  -     Nerve Block; Standing  -     Verify NPO Status; Standing  -     Verify The Time Patient Completed ERAS Hydration Drink; Standing  -     SCD (Sequential Compression Device) Place on Patient in Pre-Op; Standing  -     POC Glucose Once; Standing  -     Clip Operative Site; Standing  -     Obtain Informed Consent; Standing  -     Outpatient In A Bed  -     Follow Anesthesia Guidelines / Protocol  -     Nerve Block  -     Verify NPO Status  -     Verify The Time Patient Completed ERAS Hydration Drink  -     SCD (Sequential Compression Device) Place on Patient in Pre-Op  -     POC Glucose Once  -     Clip Operative Site  -     Obtain Informed Consent  -     Vital Signs - Per Anesthesia Protocol; Standing  -     Remove  Scopolamine Patch 24 Hours After Application; Standing  -     Continuous Pulse Oximetry; Standing  -     Pregnancy, Urine - Urine, Clean Catch; Standing  -     Insert Peripheral IV; Standing  -     lactated ringers infusion  -     acetaminophen (TYLENOL) tablet 1,000 mg  -     Midazolam HCl (PF) (VERSED) injection 2 mg  -     fentaNYL citrate (PF) (SUBLIMAZE) injection 100 mcg  -     scopolamine patch 1 mg/72 hr  -     Remove Scopolamine Patch 24 Hours After Application  -     Continuous Pulse Oximetry  -     Pregnancy, Urine - Urine, Clean Catch  -     Insert Peripheral IV        Discussed the treatment plan with the patient.     Discussed the treatment options with the patient, operative vs non-operative. The patient expressed understanding and wished to proceed with right shoulder arthroscopy.        Discussed surgery., Risks/benefits discussed with patient including, but not limited to: infection, bleeding, neurovascular damage, re-rupture, aesthetic deformity, need for further surgery, and death., Surgery pamphlet given., and Call or return if worsening symptoms.    Follow Up     2 weeks postoperatively       I have personally performed the services described in this document as scribed by the above individual and it is both accurate and complete. Dulce Sotelo MD 01/15/25

## 2025-01-15 NOTE — ANESTHESIA POSTPROCEDURE EVALUATION
Patient: Shayna Abreu    Procedure Summary       Date: 01/15/25 Room / Location: [facility] OR  / [facility]    Anesthesia Start: 1049 Anesthesia Stop: 1205    Procedure: SHOULDER ARTHROSCOPY WITH ROTATOR CUFF REPAIR, SUBCROMIAL DECOMPRESSION,POSSIBLE DISTAL CLAVICULECOMY (Right: Shoulder) Diagnosis:       Traumatic incomplete tear of right rotator cuff, subsequent encounter      (Traumatic incomplete tear of right rotator cuff, subsequent encounter [S46.011D])    Surgeons: Dulce Sotelo MD Provider: John Renteria MD    Anesthesia Type: general, regional ASA Status: 3            Anesthesia Type: general, regional    Vitals  Vitals Value Taken Time   /77 01/15/25 1304   Temp 36.7 °C (98 °F) 01/15/25 1209   Pulse 82 01/15/25 1307   Resp 17 01/15/25 1219   SpO2 96 % 01/15/25 1307   Vitals shown include unfiled device data.        Post Anesthesia Care and Evaluation    Patient location during evaluation: bedside  Patient participation: complete - patient participated  Level of consciousness: awake  Pain management: adequate    Airway patency: patent  PONV Status: none  Cardiovascular status: acceptable and stable  Respiratory status: acceptable  Hydration status: acceptable

## 2025-01-15 NOTE — OP NOTE
SHOULDER ARTHROSCOPY WITH ROTATOR CUFF REPAIR  Procedure Report    Patient Name:  Shayna Abreu  YOB: 1975    Date of Surgery:  1/15/2025     Indications:  shoulder pain/injury, failed conservative care    Pre-op Diagnosis:   Traumatic incomplete tear of right rotator cuff, subsequent encounter [S46.011D]       Post-Op Diagnosis Codes:     * Traumatic incomplete tear of right rotator cuff, subsequent encounter [S46.011D]    Procedure/CPT® Codes:      Procedure(s):  SHOULDER ARTHROSCOPY WITH ROTATOR CUFF REPAIR, SUBCROMIAL DECOMPRESSION,POSSIBLE DISTAL CLAVICULECOMY    Staff:  Surgeon(s):  Dulce Sotelo MD    Assistant: Kaye Lama RN    Anesthesia: General    Estimated Blood Loss: 11 mL    Implants:    Implant Name Type Inv. Item Serial No.  Lot No. LRB No. Used Action   SUT/ANCH QUATTRO LINK KNOTLSS 4.5 - [id number] Implant SUT/ANCH QUATTRO LINK KNOTLSS 4.5  [facility] [id number] Right 1 Implanted   SUT/ANCH QUATTRO X3 PRELD W/3/SZ2/FORCEFIBER/SUT 5.5MM - [id number] Implant SUT/ANCH QUATTRO X3 PRELD W/3/SZ2/FORCEFIBER/SUT 5.5MM  [facility] [id number] Right 1 Implanted       Specimen:          None        Findings: + rotator cuff tear    Complications: none    Description of Procedure: The patient was brought to the operating room and had a preoperative antibiotic and preoperative block. The patient was placed in a modified beach chair position and was prepped and draped in sterile fashion. A posterior portal was made. The scope was inserted to the glenohumeral joint. There was no labral tear, no biceps tendon tear, and there was a full-thickness rotator cuff tear. Attention was then turned to the subacromial space where an extensive subacromial decompression was then performed using a VAPR, shaver and a marialuisa. This included an acromioplasty and bursectomy, inspection of the rotator cuff.  Attention was then turned to the distal clavicle where a distal  claviculectomy was then performed using a VAPR and a marialuisa.  Approximate 0.8 cm of distal clavicle was excised.  Following this, a mini open incision was made. The deltoid was split and retractors placed. The rotator cuff tear was identified and then this was repaired using a single medial row anchor and  lateral row anchor. A good repair was achieved. This was then thoroughly irrigated, closed using #1 Vicryl, 2-0 Vicryl and 3-0 Monocryl, and 3-0 nylon was used for the portals. Sterile dressing was applied followed by application of an ice pack and UltraSling. The patient was then extubated and taken to the recovery room in stable condition. No complications.    Assistant: Kaye Lama RN  was responsible for performing the following activities: Retraction, Suction, Irrigation, Closing, and Placing Dressing and their skilled assistance was necessary for the success of this case.    SURGICAL APPROACH: Double Row            Dulce Sotelo MD     Date: 1/15/2025  Time: 11:58 EST

## 2025-01-15 NOTE — ANESTHESIA PREPROCEDURE EVALUATION
Anesthesia Evaluation     Patient summary reviewed and Nursing notes reviewed   no history of anesthetic complications:   NPO Solid Status: > 8 hours  NPO Liquid Status: > 2 hours           Airway   Mallampati: I  TM distance: >3 FB  Neck ROM: full  Dental - normal exam     Pulmonary - negative pulmonary ROS and normal exam   Cardiovascular - negative cardio ROS and normal exam  Exercise tolerance: good (4-7 METS)        Neuro/Psych  (+) headaches  GI/Hepatic/Renal/Endo    (+) morbid obesity, GERD    Musculoskeletal (-) negative ROS    Abdominal   (+) obese   Substance History - negative use     OB/GYN negative ob/gyn ROS         Other - negative ROS       ROS/Med Hx Other: PAT Nursing Notes unavailable.               Anesthesia Plan    ASA 3     general and regional     intravenous induction     Anesthetic plan, risks, benefits, and alternatives have been provided, discussed and informed consent has been obtained with: patient.    Plan discussed with CRNA.    CODE STATUS:

## 2025-01-15 NOTE — DISCHARGE INSTRUCTIONS
DISCHARGE INSTRUCTIONS  Post-Operative  Arthroscopic Shoulder Surgery      For your surgery you had:  General anesthesia (you may have a sore throat for the first 24 hours)  You received an anesthesia medication today that can cause hormonal forms of birth control to be ineffective. You should use a different form of birth control (to prevent pregnancy) for 7 days.   IV sedation.  Local anesthesia  Monitored anesthesia care  You may experience dizziness, drowsiness, or light-headedness for several hours following surgery/procedure.  Do not stay alone today or tonight.  Limit your activity for 24 hours.  Resume your diet slowly.  Follow whatever special dietary instructions you may have been given by your doctor.  You should not drive or operate machinery or drink alcohol for 24 hours or while you are taking pain medication.  You should not sign legally binding documents.  Post-Operative Day #1 is counted as the 1st day after surgery.  [x] If you had a regional block, you will not have control of your arm for up to 12 hours.  Protect the arm with a sling or follow your physician's specific instructions.  Post-Operative Day #3  Remove your dressing.  Under the dressing you will either have sutures or staples.  Change dressing once or twice daily.  Place a dry dressing or band-aids over the small incisions.  Prior to dressing change, wash your hands.  Post-Operative Day #3  You may shower.  During the shower, you may let the water hit the incision and roll down but do not scrub or place any soap on the incision.  Do not soak it.  Pat it dry and do not put any ointments on the incision unless directed by surgeon. Then replace the dry dressing.    General Instructions  [x] Use ice pack to shoulder for 72 hours.  This can help with reducing swelling and pain relief. Change ice pack every 4 hours.  Never place ice directly on skin.  Avoid getting dressing wet.    Keep arm elevated with sling to decrease swelling and  minimize throbbing pain.  The sling will provide support for your shoulder.  It is important to remove the sling 3-5 times daily to work on range-of-motion of the elbow, wrist and hand.  You will receive a prescription for physical therapy, unless otherwise instructed by physician.  After most shoulder surgeries, it is permissible to start exercises by slowing trying to crawl your fingers up a wall until your arm is parallel to the floor.  While doing this support the affected arm at the elbow or closer to the shoulder.  You may also lean over and let the arm and hand do small or large circles or write the alphabet with your hanging arm to keep it loose.  It is normal to have some pain with these gentle exercises.  The exercises help reduce swelling and pain overall and help to avoid a stiff shoulder post-operatively.  It is okay to come out of the sling for showering or for certain activities of daily living but avoid any lifting or carrying with the affected arm until instructed by the physician especially if you have had a repair of the rotator cuff or some type of reconstructive procedure.  It is okay to come out of the sling and support the arm with a pillow if you remain sedentary.  In general, sling use is preferred following a repair or reconstruction for 4 weeks.  If you have had a SAD (sub acromial decompression), continue sling use more liberally because there was not a repair or reconstruction that could be harmed.          DISCHARGE INSTRUCTIONS  Post-Operative   Arthroscopic Shoulder Surgery      Exercise fingers for 10 minutes every hour while awake.  The physician will typically inform the family and the patient whether or not a repair or reconstruction could be harmed.  If you have had a rotator cuff repair or reconstructive procedure, do not let the arm drop down suddenly from an elevated position down to your side despite improvements made in pain and over the 3 months following repair and  reconstruction.  It generally takes 8-12 weeks before the repair or reconstruction does not rely on sutures and anchors that are placed for the repair.  You are generally given a prescription for a narcotic medication.  Take as prescribed.  You may use over-the-counter anti-inflammatory medications such as Motrin or Aleve for additional pain or fever reduction if not allergic.  If you are taking the pain medication prescribed, do not take Tylenol too unless you consult with the pharmacist. The pain medications generally have Tylenol in them and too much Tylenol can be harmful.  Sometimes it is recommended to take an anti-inflammatory in between doses of prescription pain medication if additional pain relief is needed.  Consult with your physician or your pharmacist before taking over-the-counter pain medications/anti-inflammatories.  It is not uncommon to have a fever post-operatively especially in the first 24-48 hours.  Anti-inflammatories can be used for fever reduction also.  It is normal to have some redness or irritation around skin sutures or staples, however, if you have any expanding areas of redness with a persistent fever and increasing pain notify the physician as soon as possible.  The incidence of blood clots is low following arthroscopic procedures, however, if you notice any increasing pain or swelling in your calves or legs, contact the physician as soon as possible.   It is difficult to sleep in the customary fashion following a shoulder surgery.  It is usually necessary to sleep with the shoulder above the level of the heart such as in a recliner, couch or with the head of the bed elevated.  This should slowly improve over the weeks following shoulder surgery.  If unable to urinate 6 to 8 hours after surgery or urinating frequently in small amounts, notify the physician or go to the nearest Emergency Room.    NOTIFY YOUR PHYSICIAN IF YOU EXPERIENCE:  Numbness of fingers.  Inability to move  fingers.  Extreme coldness, paleness or blue dis-coloration of fingers.  Any pain other than from the incision, such as swelling of the arm that blocks circulation of fingers.  Follow verbal instructions from your doctor.  Medications per physician instructions as indicated on Discharge Medication Information Sheet.  Missing your appointment/follow-up could lead to serious complications  If you have an emergency and cannot reach your doctor, go to an Emergency Room nearest your home.

## 2025-01-15 NOTE — ANESTHESIA PROCEDURE NOTES
Peripheral Block    Pre-sedation assessment completed: 1/15/2025 9:15 AM    Patient reassessed immediately prior to procedure    Patient location during procedure: pre-op  Start time: 1/15/2025 9:50 AM  Stop time: 1/15/2025 9:55 AM  Reason for block: at surgeon's request and post-op pain management  Performed by  Anesthesiologist: John Renteria MD  Preanesthetic Checklist  Completed: patient identified, IV checked, site marked, risks and benefits discussed, surgical consent, monitors and equipment checked, pre-op evaluation and timeout performed  Prep:  Pt Position: supine (HOB elevated)  Sterile barriers:cap, washed/disinfected hands, sterile barriers, gloves, mask, partial drape and alcohol skin prep  Prep: ChloraPrep  Patient monitoring: blood pressure monitoring, continuous pulse oximetry and EKG  Procedure    Sedation: yes  Performed under: local infiltration  Guidance:ultrasound guided and nerve stimulator    ULTRASOUND INTERPRETATION.  Using ultrasound guidance a 22 G gauge needle was placed in close proximity to the brachial plexus nerve, at which point, under ultrasound guidance anesthetic was injected in the area of the nerve and spread of the anesthesia was seen on ultrasound in close proximity thereto.  There were no abnormalities seen on ultrasound; a digital image was taken; and the patient tolerated the procedure with no complications. Images:still images obtained, printed/placed on chart    Laterality:right  Block Type:interscalene  Injection Technique:single-shot  Needle Type:echogenic  Needle Gauge:22 G (2in)  Resistance on Injection: none    Medications Used: ropivacaine (NAROPIN) 0.5 % injection - Injection   20 mL - 1/15/2025 9:55:00 AM      Post Assessment  Injection Assessment: negative aspiration for heme, no paresthesia on injection and incremental injection  Patient Tolerance:comfortable throughout block  Complications:no  Additional Notes  The block or continuous infusion is requested  by the referring physician for management of postoperative pain, or pain related to a procedure. Ultrasound guidance (deemed medically necessary). Painless injection, pt was awake and conversant during the procedure without complications. Needle and surrounding structures visualized throughout procedure. No adverse reactions or complications seen during this period. Post-procedure image showed no signs of complication, and anatomy was consistent with an uncomplicated nerve blockade.  Performed by: John Renteria MD

## 2025-01-16 ENCOUNTER — TELEPHONE (OUTPATIENT)
Dept: FAMILY MEDICINE CLINIC | Facility: CLINIC | Age: 50
End: 2025-01-16
Payer: COMMERCIAL

## 2025-01-16 NOTE — TELEPHONE ENCOUNTER
Patient called regarding bill for DOS 12/12/2024. Patient is being charged a $50 copay and states she only had a physical. Informed patient that she had a med change on that DOS so that part of the visit is not covered under her annual physical. We had a three way call with billing to confirm.

## 2025-01-20 ENCOUNTER — TREATMENT (OUTPATIENT)
Dept: PHYSICAL THERAPY | Facility: CLINIC | Age: 50
End: 2025-01-20
Payer: OTHER MISCELLANEOUS

## 2025-01-20 DIAGNOSIS — Z98.890 S/P RIGHT ROTATOR CUFF REPAIR: ICD-10-CM

## 2025-01-20 DIAGNOSIS — M25.511 ACUTE POSTOPERATIVE PAIN OF RIGHT SHOULDER: Primary | ICD-10-CM

## 2025-01-20 DIAGNOSIS — G89.18 ACUTE POSTOPERATIVE PAIN OF RIGHT SHOULDER: Primary | ICD-10-CM

## 2025-01-20 DIAGNOSIS — R29.898 WEAKNESS OF RIGHT SHOULDER: ICD-10-CM

## 2025-01-20 PROCEDURE — 97140 MANUAL THERAPY 1/> REGIONS: CPT

## 2025-01-20 PROCEDURE — 97110 THERAPEUTIC EXERCISES: CPT

## 2025-01-20 PROCEDURE — 97161 PT EVAL LOW COMPLEX 20 MIN: CPT

## 2025-01-20 PROCEDURE — 97535 SELF CARE MNGMENT TRAINING: CPT

## 2025-01-20 NOTE — PROGRESS NOTES
Physical Therapy Initial Evaluation and Plan of Care                       Patient: Shayna Abreu   : 1975  Diagnosis/ICD-10 Code:  Acute postoperative pain of right shoulder [G89.18, M25.511]  Referring practitioner: Dulce Sotelo MD  Date of Initial Visit: 2025  Today's Date: 2025  Patient seen for 1 sessions           Subjective Questionnaire: QuickDASH: 49      Subjective Evaluation    History of Present Illness  Date of onset: 3/26/2024  Date of surgery: 1/15/2025  Mechanism of injury: Patient initially hurt her shoulder on 3/26/24. She had underwent 2 rounds of injections prior to this surgery and her surgery was on hold due to the IV shortage. She denies issues with the shoulder prior to her fall.       Patient Occupation:  Pain  Current pain ratin  Quality: discomfort and dull ache (sore)  Relieving factors: ice and medications  Aggravating factors: prolonged positioning, outstretched reach, repetitive movement, sleeping, movement, lifting and overhead activity    Hand dominance: right    Treatments  Current treatment: medication  Discharged from (in last 30 days): inpatient hospitalization  Patient Goals  Patient goals for therapy: increased strength, decreased pain, increased motion, independence with ADLs/IADLs, return to sport/leisure activities and return to work             Objective          Observations     Right Shoulder  Positive for edema and incision.     Additional Shoulder Observation Details  Well fit surgical sling with independent mngmt    Passive Range of Motion     Right Shoulder   Flexion: 91 degrees with pain  External rotation 0°: 10 degrees with pain    Strength/Myotome Testing     Additional Strength Details  Not tested due to precaution      See Exercise, Manual, and Modality Logs for complete treatment.     Assessment & Plan       Assessment  Impairments: abnormal muscle firing, abnormal muscle tone, abnormal or restricted ROM,  activity intolerance, impaired physical strength, lacks appropriate home exercise program, pain with function and safety issue   Functional limitations: carrying objects, lifting, pulling, pushing, reaching behind back, reaching overhead and unable to perform repetitive tasks   Assessment details: The patient presents to physical therapy s/p right shoulder SHOULDER ARTHROSCOPY WITH ROTATOR CUFF REPAIR, SUBCROMIAL DECOMPRESSION, DISTAL CLAVICULECOMY on 1/15/25. The patient presents with associated right shoulder pain, weakness stiffness, tenderness to palpation, and functional deficits (QuickDASH). The patient would benefit from skilled physical therapy as described below to address these limitations and allow the patient to return to her prior level of function.  Prognosis: good    Goals  Plan Goals: SHOULDER  PROBLEMS:     1. The patient has limited ROM of the right shoulder.    LTG 1: 12 weeks:  The patient will demonstrate 165 degrees of right shoulder flexion, 165 degrees of shoulder abduction, 80 degrees of shoulder external rotation, and 80 degrees of shoulder internal rotation to allow the patient to reach into upper kitchen cabinets and manipulate clothing behind the back with greater ease.    STATUS:  New   STG 1a: 6 weeks:  The patient will demonstrate 125 degrees of right shoulder flexion, 125 degrees of shoulder abduction, 70 degrees of shoulder external rotation, and 70 degrees of shoulder internal rotation.    STATUS:  New    2. The patient has limited strength of the right shoulder.   LTG 2: 12 weeks:  The patient will demonstrate 4+/5 strength for right shoulder flexion, abduction, external rotation, and internal rotation in order to demonstrate improved shoulder stability.    STATUS:  New   STG 2a: 8 weeks:  The patient will demonstrate 3+/5 strength for right shoulder flexion, abduction, external rotation, and internal rotation.    STATUS:  New   STG2b:  2 weeks:  The patient will be independent  with home exercises.     STATUS:  New     3. The patient complains of pain to the right shoulder.   LTG 3: 12 weeks:  The patient will report a pain rating of 1/10 or better in order to improve sleep quality and tolerance to performance of activities of daily living.    STATUS:  New   STG 3a: 8 weeks:  The patient will report a pain rating of 3/10 or better.     STATUS:  New    4. Carrying, Moving, and Handling Objects Functional Limitation     LTG 4: 12 weeks:  The patient will demonstrate 9.09 % limitation by achieving a score of 15/55 on the QuickDASH.    STATUS:  New   STG 4a: 6 weeks:  The patient will demonstrate 50 % limitation by achieving a score of 33/55 on the QuickDASH.      STATUS:  New    Plan  Therapy options: will be seen for skilled therapy services  Planned modality interventions: cryotherapy, electrical stimulation/Russian stimulation and TENS  Planned therapy interventions: ADL retraining, soft tissue mobilization, strengthening, stretching, therapeutic activities, joint mobilization, home exercise program, functional ROM exercises, flexibility, body mechanics training, postural training, neuromuscular re-education, manual therapy and motor coordination training  Frequency: 3x week  Duration in weeks: 12  Treatment plan discussed with: patient        Visit Diagnoses:    ICD-10-CM ICD-9-CM   1. Acute postoperative pain of right shoulder  G89.18 719.41    M25.511 338.18   2. S/P right rotator cuff repair  Z98.890 V45.89   3. Weakness of right shoulder  R29.898 781.99       History # of Personal Factors and/or Comorbidities: LOW (0)  Examination of Body System(s): # of elements: LOW (1-2)  Clinical Presentation: STABLE   Clinical Decision Making: LOW       Timed:         Manual Therapy:    10     mins  32365;     Therapeutic Exercise:    20     mins  14973;     Neuromuscular Concha:    0    mins  98769;    Therapeutic Activity:     0     mins  17941;     Gait Trainin     mins  02512;      Ultrasound:     0     mins  54407;    Ionto                               0    mins   12520  Self Care                       10     mins   28328  Canalith Repos    0     mins 36469      Un-Timed:  Electrical Stimulation:    0     mins  98005 ( );  Dry Needling     0     mins self-pay  Traction     0     mins 10911  Low Eval     18     Mins  58085  Mod Eval     0     Mins  10534  High Eval                       0     Mins  69324  Re-Eval                           0    mins  95805    Timed Treatment:   40   mins   Total Treatment:     58   mins    PT SIGNATURE: Dmitriy Rubalcava PT    Electronically signed 1/20/2025    [place] License: PT - [id number]      Initial Certification  Certification Period: 1/20/2025 thru 4/19/2025  I certify that the therapy services are furnished while this patient is under my care.  The services outlined above are required by this patient, and will be reviewed every 90 days.     PHYSICIAN: Dulce Sotelo MD  NPI: [id number]      DATE:     Please sign and return via fax to [phone]. Thank you, [facility].

## 2025-01-23 ENCOUNTER — TREATMENT (OUTPATIENT)
Dept: PHYSICAL THERAPY | Facility: CLINIC | Age: 50
End: 2025-01-23
Payer: OTHER MISCELLANEOUS

## 2025-01-23 DIAGNOSIS — G89.18 ACUTE POSTOPERATIVE PAIN OF RIGHT SHOULDER: Primary | ICD-10-CM

## 2025-01-23 DIAGNOSIS — Z98.890 S/P RIGHT ROTATOR CUFF REPAIR: ICD-10-CM

## 2025-01-23 DIAGNOSIS — R29.898 WEAKNESS OF RIGHT SHOULDER: ICD-10-CM

## 2025-01-23 DIAGNOSIS — M25.511 ACUTE POSTOPERATIVE PAIN OF RIGHT SHOULDER: Primary | ICD-10-CM

## 2025-01-23 NOTE — PROGRESS NOTES
[facility] [place] [facility]                              Physical Therapy Daily Treatment Note    Patient: Shayna Abreu   : 1975  Diagnosis/ICD-10 Code:  Acute postoperative pain of right shoulder [G89.18, M25.511]  Referring practitioner: Dulce Sotelo MD  Date of Initial Visit: Type: THERAPY  Noted: 2025  Today's Date: 2025  Patient seen for 2 sessions           Subjective   Shayna Abreu reports: she isn't having any pain in her shoulder at time of therapy. Pt states she is doing her exercises and stretches at home, icing her shoulder also.      Objective       See Exercise, Manual, and Modality Logs for complete treatment.     Assessment/Plan  Today's is patients first treatment after initial eval. Pt's ROM looks good during manual. No complaints of pain or discomfort during treatment session. Pt will continue to benefit from skilled PT services in order to address ROM, strength deficits and pain management in order to improve upon daily functional activities and to return to work without restrictions.    Progress per Plan of Care         Timed:  Manual Therapy:    23     mins  84700;  Therapeutic Exercise:     8    mins  08756;     Neuromuscular Concha:       mins  29466;    Therapeutic Activity:          mins  59494;     Gait Training:           mins  06212;        Untimed:  Electrical Stimulation:         mins  69627 ( );  Mechanical Traction:         mins  49570;       Timed Treatment:   31   mins   Total Treatment:     31   mins      Electronically signed:     Avis Irby PTA  Physical Therapist Assistant  [facility] License #: [id number]

## 2025-01-27 ENCOUNTER — TREATMENT (OUTPATIENT)
Dept: PHYSICAL THERAPY | Facility: CLINIC | Age: 50
End: 2025-01-27
Payer: OTHER MISCELLANEOUS

## 2025-01-27 DIAGNOSIS — M25.511 ACUTE POSTOPERATIVE PAIN OF RIGHT SHOULDER: ICD-10-CM

## 2025-01-27 DIAGNOSIS — R29.898 WEAKNESS OF RIGHT SHOULDER: ICD-10-CM

## 2025-01-27 DIAGNOSIS — Z98.890 S/P RIGHT ROTATOR CUFF REPAIR: Primary | ICD-10-CM

## 2025-01-27 DIAGNOSIS — G89.18 ACUTE POSTOPERATIVE PAIN OF RIGHT SHOULDER: ICD-10-CM

## 2025-01-27 PROCEDURE — 97110 THERAPEUTIC EXERCISES: CPT

## 2025-01-27 PROCEDURE — 97140 MANUAL THERAPY 1/> REGIONS: CPT

## 2025-01-27 NOTE — PROGRESS NOTES
[facility] [place] [facility]                              Physical Therapy Daily Treatment Note    Patient: Shayna Abreu   : 1975  Diagnosis/ICD-10 Code:  S/P right rotator cuff repair [Z98.890]  Referring practitioner: Dulce Sotelo MD  Date of Initial Visit: Type: THERAPY  Noted: 2025  Today's Date: 2025  Patient seen for 3 sessions           Subjective   Shayna Abreu reports: the last 2 nights she had a hard time sleeping and getting comfortable due to the ache and discomfort in her right shoulder and tricep.      Objective     See Exercise, Manual, and Modality Logs for complete treatment.     Assessment/Plan  Shayna progressing as evident by decreased overall right shoulder pain. Pt tolerated exercises and manual well, with no complaints of increased pain or discomfort. Patient will continue to benefit from skilled physical therapy services to further address pain management,  their deficits in strength, and range of motion in order to improve upon their functional mobility for any ADL concerns.      Progress per Plan of Care         Timed:  Manual Therapy:    23     mins  69263;  Therapeutic Exercise:    9     mins  96895;     Neuromuscular Concha:        mins  79202;    Therapeutic Activity:          mins  79459;     Gait Training:           mins  75544;        Untimed:  Electrical Stimulation:         mins  34408 ( );  Mechanical Traction:         mins  94320;       Timed Treatment:   32   mins   Total Treatment:     32   mins      Electronically signed:     Avis Irby PTA  Physical Therapist Assistant  [facility] License #: [id number]

## 2025-01-30 ENCOUNTER — OFFICE VISIT (OUTPATIENT)
Dept: ORTHOPEDIC SURGERY | Facility: CLINIC | Age: 50
End: 2025-01-30
Payer: OTHER MISCELLANEOUS

## 2025-01-30 ENCOUNTER — TREATMENT (OUTPATIENT)
Dept: PHYSICAL THERAPY | Facility: CLINIC | Age: 50
End: 2025-01-30
Payer: OTHER MISCELLANEOUS

## 2025-01-30 VITALS
HEIGHT: 68 IN | BODY MASS INDEX: 40.77 KG/M2 | SYSTOLIC BLOOD PRESSURE: 138 MMHG | HEART RATE: 79 BPM | WEIGHT: 269 LBS | OXYGEN SATURATION: 96 % | DIASTOLIC BLOOD PRESSURE: 87 MMHG

## 2025-01-30 DIAGNOSIS — R29.898 WEAKNESS OF RIGHT SHOULDER: ICD-10-CM

## 2025-01-30 DIAGNOSIS — M25.511 RIGHT SHOULDER PAIN, UNSPECIFIED CHRONICITY: ICD-10-CM

## 2025-01-30 DIAGNOSIS — M75.121 COMPLETE TEAR OF RIGHT ROTATOR CUFF, UNSPECIFIED WHETHER TRAUMATIC: ICD-10-CM

## 2025-01-30 DIAGNOSIS — Z98.890 S/P ARTHROSCOPY OF RIGHT SHOULDER: Primary | ICD-10-CM

## 2025-01-30 DIAGNOSIS — Z98.890 S/P RIGHT ROTATOR CUFF REPAIR: ICD-10-CM

## 2025-01-30 DIAGNOSIS — M25.511 ACUTE POSTOPERATIVE PAIN OF RIGHT SHOULDER: Primary | ICD-10-CM

## 2025-01-30 DIAGNOSIS — G89.18 ACUTE POSTOPERATIVE PAIN OF RIGHT SHOULDER: Primary | ICD-10-CM

## 2025-01-30 PROCEDURE — 99024 POSTOP FOLLOW-UP VISIT: CPT

## 2025-01-30 RX ORDER — HYDROCODONE BITARTRATE AND ACETAMINOPHEN 7.5; 325 MG/1; MG/1
1-2 TABLET ORAL EVERY 4 HOURS PRN
Qty: 42 TABLET | Refills: 0 | Status: SHIPPED | OUTPATIENT
Start: 2025-01-30

## 2025-01-30 NOTE — PROGRESS NOTES
"Chief Complaint  Follow-up of the Right Shoulder    Subjective      Shayna Abreu presents to [facility] for follow up of their right shoulder.  She is 2 weeks status post right shoulder arthroscopy with rotator cuff repair, subacromial decompression and distal claviculectomy performed by Dr. Sotelo on 1/15/2025.  He arrives today with her sling.  She is attending physical therapy at [facility].  Overall she reports she is doing very well.  She states that she did take her pain medication in the beginning but has not had any for a few days because she is out.  She states that she has minimal pain right now. She has been doing her home exercises.    Allergies   Allergen Reactions    Penicillins Hives     AS A CHILD       Objective     Vital Signs:   Vitals:    01/30/25 0859   BP: 138/87   Pulse: 79   SpO2: 96%   Weight: 122 kg (269 lb)   Height: 172.7 cm (67.99\")     Body mass index is 40.91 kg/m².    I reviewed the patient's chief complaint, history of present illness, review of systems, past medical history, surgical history, family history, social history, medications, and allergy list.     Ortho Exam  Shoulder Scope   General: Alert, no acute distress  Right upper extremity: Sutures removed today without complications. Arthroscopy portals are well healing without active drainage or redness noted. No concerning signs of infection. Demonstrates intact active elbow flexion and extension. Demonstrates intact active wrist and finger range of motion. Thumb opposition intact. Palmar abduction of the thumb intact. Sensation intact to the axillary, median, radial, and ulnar nerve distributions. Palpable radial pulse.           Imaging Results (Most Recent)       None                Assessment and Plan   Diagnoses and all orders for this visit:    1. S/P arthroscopy of right shoulder with RCR, SAD, DC (Primary)    2. Right shoulder pain, unspecified chronicity     "     Shayna Abreu presents to clinic today for 2 week post op right shoulder arthroscopy with rotator cuff repair, subacromial decompression and distal claviculectomy, performed by Dr Sotelo on 1/15/2025. Sutures/staples removed today, steri-strips applied. Incisions are well-healing.  No active drainage or redness noted.  No concerning signs of infection.  Patient is doing well and denies fever or chills.  Incision site care was reviewed today. Patient instructed not to soak or submerge incision. Do not apply any lotions, creams, or ointments to the incision at this time.  We discussed concerning signs and symptoms regarding the incision site.    Continue to wear sling for 4-6 weeks after surgery. Discussed with patient the importance of gentle ROM, including pendulums. Home exercises were demonstrated in office today. No lifting, pushing or pulling with the right arm.  Continue physical therapy.    Work note written for her to return next week on light duty.  We discussed precautions and recommendations.  Ice the shoulder as needed for pain and inflammation.     Follow up in 4 weeks.     Call the office if you have any questions or concerns.          Tobacco Use: Medium Risk (1/30/2025)    Patient History     Smoking Tobacco Use: Former     Smokeless Tobacco Use: Never     Passive Exposure: Past     Patient reports they have a history of tobacco use; encouraged continued tobacco cessation for further health benefits.            Follow Up   Return in about 1 month (around 2/28/2025).  There are no Patient Instructions on file for this visit.    Patient was given instructions and counseling regarding her condition or for health maintenance advice. Please see specific information pulled into the AVS if appropriate.     Dictated Utilizing Dragon Dictation. Please note that portions of this note were completed with a voice recognition program. Part of this note may be an electronic transcription/translation of spoken  language to printed text using the Dragon Dictation System.

## 2025-01-30 NOTE — PROGRESS NOTES
Physical Therapy Daily Treatment Note                          Patient: Shayna Abreu   : 1975  Diagnosis/ICD-10 Code:  Acute postoperative pain of right shoulder [G89.18, M25.511]  Referring practitioner: Dulce Sotelo MD  Date of Initial Visit: Type: THERAPY  Noted: 2025  Today's Date: 2025  Patient seen for 4 sessions           Subjective   The patient reported no new complaints, she is weaning out of her sling    Objective   See Exercise, Manual, and Modality Logs for complete treatment.     Assessment/Plan     Patient tolerated today's treatment without complaints of pain. Her ROM is progressing very well with manual stretching . Strength, ROM, and increased pain with activity deficits still limits patient's ability to perform ADLs. Further skilled care indicated at this time.       Timed:  Manual Therapy:    10     mins  99613;  Therapeutic Exercise:    24     mins  74933;     Neuromuscular Concha:   0    mins  38942;    Therapeutic Activity:     0     mins  30402;     Gait Trainin     mins  45353;     Aquatics                         0      mins  56477    Un-timed:  Mechanical Traction      0     mins  79702  Dry Needling     0     mins self-pay  Electrical Stimulation:    0     mins  86997 ( );      Timed Treatment:   34   mins   Total Treatment:     34   mins    Dmitriy Rubalcava PT  Physical Therapist    Electronically signed 2025    [place] License: PT - [id number]

## 2025-02-03 ENCOUNTER — TREATMENT (OUTPATIENT)
Dept: PHYSICAL THERAPY | Facility: CLINIC | Age: 50
End: 2025-02-03
Payer: OTHER MISCELLANEOUS

## 2025-02-03 DIAGNOSIS — Z98.890 S/P RIGHT ROTATOR CUFF REPAIR: Primary | ICD-10-CM

## 2025-02-03 DIAGNOSIS — R29.898 WEAKNESS OF RIGHT SHOULDER: ICD-10-CM

## 2025-02-03 DIAGNOSIS — G89.18 ACUTE POSTOPERATIVE PAIN OF RIGHT SHOULDER: ICD-10-CM

## 2025-02-03 DIAGNOSIS — M25.511 ACUTE POSTOPERATIVE PAIN OF RIGHT SHOULDER: ICD-10-CM

## 2025-02-03 PROCEDURE — 97140 MANUAL THERAPY 1/> REGIONS: CPT

## 2025-02-03 PROCEDURE — 97110 THERAPEUTIC EXERCISES: CPT

## 2025-02-03 PROCEDURE — 97530 THERAPEUTIC ACTIVITIES: CPT

## 2025-02-03 NOTE — PROGRESS NOTES
[facility]                              Physical Therapy Daily Treatment Note    Patient: Shayna Abreu   : 1975  Diagnosis/ICD-10 Code:  S/P right rotator cuff repair [Z98.890]  Referring practitioner: Dulce Sotelo MD  Date of Initial Visit: Type: THERAPY  Noted: 2025  Today's Date: 2/3/2025  Patient seen for 5 sessions           Subjective   Shayna Abreu reports: overall she has been feeling pretty good. Pt states she does have some discomfort in her bicep.       Objective     See Exercise, Manual, and Modality Logs for complete treatment.     Assessment/Plan  Shayna progressing as evident by decreased overall right shoulder pain. Pt tolerated exercises and manual well, with no complaints of increased pain or discomfort. Patient will continue to benefit from skilled physical therapy services to further address pain management,  their deficits in strength, and range of motion in order to improve upon their functional mobility for any ADL concerns.      Progress per Plan of Care         Timed:  Manual Therapy:    10     mins  81732;  Therapeutic Exercise:    10     mins  06240;     Neuromuscular Concha:        mins  58979;    Therapeutic Activity:     8     mins  86507;     Gait Training:           mins  87397;        Untimed:  Electrical Stimulation:         mins  85708 ( );  Mechanical Traction:         mins  80791;       Timed Treatment:   28   mins   Total Treatment:     28   mins      Electronically signed:     Avis Irby PTA  Physical Therapist Assistant  [facility] License #: [id number]

## 2025-02-06 ENCOUNTER — TREATMENT (OUTPATIENT)
Dept: PHYSICAL THERAPY | Facility: CLINIC | Age: 50
End: 2025-02-06
Payer: OTHER MISCELLANEOUS

## 2025-02-06 DIAGNOSIS — M25.511 ACUTE POSTOPERATIVE PAIN OF RIGHT SHOULDER: ICD-10-CM

## 2025-02-06 DIAGNOSIS — G89.18 ACUTE POSTOPERATIVE PAIN OF RIGHT SHOULDER: ICD-10-CM

## 2025-02-06 DIAGNOSIS — Z98.890 S/P RIGHT ROTATOR CUFF REPAIR: Primary | ICD-10-CM

## 2025-02-06 DIAGNOSIS — R29.898 WEAKNESS OF RIGHT SHOULDER: ICD-10-CM

## 2025-02-06 NOTE — PROGRESS NOTES
[facility]                              Physical Therapy Daily Treatment Note    Patient: Shayna Abreu   : 1975  Diagnosis/ICD-10 Code:  S/P right rotator cuff repair [Z98.890]  Referring practitioner: Dulce Sotelo MD  Date of Initial Visit: Type: THERAPY  Noted: 2025  Today's Date: 2025  Patient seen for 6 sessions           Subjective   Shayna Abreu reports: her shoulder feels good today. Pt states she helped her  move things around the garage and clean out on Monday and was sore the next day.      Objective     See Exercise, Manual, and Modality Logs for complete treatment.     Assessment/Plan  Shayna progressing as evident by decreased overall right shoulder pain. Pt tolerated exercises and manual well, with no complaints of increased pain or discomfort. Patient will continue to benefit from skilled physical therapy services to further address pain management,  their deficits in strength, and range of motion in order to improve upon their functional mobility for any ADL concerns.      Progress per Plan of Care         Timed:  Manual Therapy:    10     mins  23456;  Therapeutic Exercise:    10     mins  95729;     Neuromuscular Concha:    8    mins  82727;    Therapeutic Activity:          mins  20206;     Gait Training:           mins  70790;        Untimed:  Electrical Stimulation:         mins  04247 ( );  Mechanical Traction:         mins  00269;       Timed Treatment:   28   mins   Total Treatment:     28   mins      Electronically signed:     Avis Irby PTA  Physical Therapist Assistant  [facility] License #: [id number]

## 2025-02-08 DIAGNOSIS — S46.011D TRAUMATIC INCOMPLETE TEAR OF RIGHT ROTATOR CUFF, SUBSEQUENT ENCOUNTER: ICD-10-CM

## 2025-02-08 DIAGNOSIS — M25.511 RIGHT SHOULDER PAIN, UNSPECIFIED CHRONICITY: ICD-10-CM

## 2025-02-08 DIAGNOSIS — S49.91XA INJURY OF RIGHT SHOULDER, INITIAL ENCOUNTER: ICD-10-CM

## 2025-02-10 ENCOUNTER — TREATMENT (OUTPATIENT)
Dept: PHYSICAL THERAPY | Facility: CLINIC | Age: 50
End: 2025-02-10
Payer: OTHER MISCELLANEOUS

## 2025-02-10 DIAGNOSIS — M25.511 ACUTE POSTOPERATIVE PAIN OF RIGHT SHOULDER: Primary | ICD-10-CM

## 2025-02-10 DIAGNOSIS — Z98.890 S/P RIGHT ROTATOR CUFF REPAIR: ICD-10-CM

## 2025-02-10 DIAGNOSIS — R29.898 WEAKNESS OF RIGHT SHOULDER: ICD-10-CM

## 2025-02-10 DIAGNOSIS — G89.18 ACUTE POSTOPERATIVE PAIN OF RIGHT SHOULDER: Primary | ICD-10-CM

## 2025-02-10 PROCEDURE — 97140 MANUAL THERAPY 1/> REGIONS: CPT

## 2025-02-10 PROCEDURE — 97110 THERAPEUTIC EXERCISES: CPT

## 2025-02-10 PROCEDURE — 97112 NEUROMUSCULAR REEDUCATION: CPT

## 2025-02-10 RX ORDER — MELOXICAM 15 MG/1
15 TABLET ORAL DAILY
Qty: 30 TABLET | Refills: 0 | Status: SHIPPED | OUTPATIENT
Start: 2025-02-10

## 2025-02-10 NOTE — PROGRESS NOTES
Physical Therapy Daily Treatment Note                          Patient: Shayna Abreu   : 1975  Diagnosis/ICD-10 Code:  Acute postoperative pain of right shoulder [G89.18, M25.511]  Referring practitioner: Dulce Sotelo MD  Date of Initial Visit: Type: THERAPY  Noted: 2025  Today's Date: 2/10/2025  Patient seen for 7 sessions           Subjective   The patient reported intermittent popping when she stretches that is not painful    Objective   See Exercise, Manual, and Modality Logs for complete treatment.     Assessment/Plan     Patient tolerated today's treatment without complaints of pain. Improvements noted in ROM following stretching and manual intervention. Strength, ROM, and increased pain with activity deficits still limits patient's ability to perform ADLs. Further skilled care indicated at this time.       Timed:  Manual Therapy:    10     mins  59790;  Therapeutic Exercise:    19     mins  65777;     Neuromuscular Concha:   10    mins  78127;    Therapeutic Activity:     0     mins  59181;     Gait Trainin     mins  22241;     Aquatics                         0      mins  08565    Un-timed:  Mechanical Traction      0     mins  30987  Dry Needling     0     mins self-pay  Electrical Stimulation:    0     mins  99341 ( );      Timed Treatment:   39   mins   Total Treatment:     39   mins    Dmitriy Rubalcava PT  Physical Therapist    Electronically signed 2/10/2025    [place] License: PT - [id number]

## 2025-02-13 ENCOUNTER — TREATMENT (OUTPATIENT)
Dept: PHYSICAL THERAPY | Facility: CLINIC | Age: 50
End: 2025-02-13
Payer: OTHER MISCELLANEOUS

## 2025-02-13 DIAGNOSIS — Z98.890 S/P RIGHT ROTATOR CUFF REPAIR: ICD-10-CM

## 2025-02-13 DIAGNOSIS — G89.18 ACUTE POSTOPERATIVE PAIN OF RIGHT SHOULDER: Primary | ICD-10-CM

## 2025-02-13 DIAGNOSIS — R29.898 WEAKNESS OF RIGHT SHOULDER: ICD-10-CM

## 2025-02-13 DIAGNOSIS — M25.511 ACUTE POSTOPERATIVE PAIN OF RIGHT SHOULDER: Primary | ICD-10-CM

## 2025-02-13 NOTE — PROGRESS NOTES
Physical Therapy Daily Treatment Note                          Patient: Shayna Abreu   : 1975  Diagnosis/ICD-10 Code:  Acute postoperative pain of right shoulder [G89.18, M25.511]  Referring practitioner: Dulce Sotelo MD  Date of Initial Visit: Type: THERAPY  Noted: 2025  Today's Date: 2025  Patient seen for 8 sessions           Subjective   The patient reported no new complaints.    Objective   See Exercise, Manual, and Modality Logs for complete treatment.     Assessment/Plan     Patient tolerated today's treatment without complaints of pain. She continues to progress well per protocol. Strength, ROM, and increased pain with activity deficits still limits patient's ability to perform ADLs. Further skilled care indicated at this time.       Timed:  Manual Therapy:    10     mins  96186;  Therapeutic Exercise:    15     mins  38001;     Neuromuscular Concha:   0    mins  19744;    Therapeutic Activity:     10     mins  49312;     Gait Trainin     mins  11399;     Aquatics                         0      mins  69071    Un-timed:  Mechanical Traction      0     mins  49793  Dry Needling     0     mins self-pay  Electrical Stimulation:    0     mins  66958 ( );      Timed Treatment:   35   mins   Total Treatment:     35   mins    Dmitriy Rubalcava PT  Physical Therapist    Electronically signed 2025    [place] License: PT - [id number]

## 2025-02-17 ENCOUNTER — TREATMENT (OUTPATIENT)
Dept: PHYSICAL THERAPY | Facility: CLINIC | Age: 50
End: 2025-02-17
Payer: OTHER MISCELLANEOUS

## 2025-02-17 DIAGNOSIS — G89.18 ACUTE POSTOPERATIVE PAIN OF RIGHT SHOULDER: Primary | ICD-10-CM

## 2025-02-17 DIAGNOSIS — R29.898 WEAKNESS OF RIGHT SHOULDER: ICD-10-CM

## 2025-02-17 DIAGNOSIS — M25.511 ACUTE POSTOPERATIVE PAIN OF RIGHT SHOULDER: Primary | ICD-10-CM

## 2025-02-17 DIAGNOSIS — Z98.890 S/P RIGHT ROTATOR CUFF REPAIR: ICD-10-CM

## 2025-02-17 PROCEDURE — 97530 THERAPEUTIC ACTIVITIES: CPT

## 2025-02-17 PROCEDURE — 97140 MANUAL THERAPY 1/> REGIONS: CPT

## 2025-02-17 PROCEDURE — 97110 THERAPEUTIC EXERCISES: CPT

## 2025-02-17 NOTE — PROGRESS NOTES
Physical Therapy Daily Treatment Note                          Patient: Shayna Abreu   : 1975  Diagnosis/ICD-10 Code:  Acute postoperative pain of right shoulder [G89.18, M25.511]  Referring practitioner: Dulce Sotelo MD  Date of Initial Visit: No linked episodes  Today's Date: 2025  Patient seen for Visit count could not be calculated. Make sure you are using a visit which is associated with an episode. sessions           Subjective   The patient reported no new complaints.    Objective   See Exercise, Manual, and Modality Logs for complete treatment.     Assessment/Plan     Patient tolerated today's treatment without complaints of pain. Her ROM continues to progress well overall. Initiated pulleys today with no complaints of pain Strength, ROM, and increased pain with activity deficits still limits patient's ability to perform ADLs and work tasks. Further skilled care indicated at this time.       Timed:  Manual Therapy:    10     mins  16887;  Therapeutic Exercise:    10     mins  93268;     Neuromuscular Concha:   0    mins  60549;    Therapeutic Activity:     10     mins  15811;     Gait Trainin     mins  30777;     Aquatics                         0      mins  45088    Un-timed:  Mechanical Traction      0     mins  28171  Dry Needling     0     mins self-pay  Electrical Stimulation:    0     mins  47429 ( );      Timed Treatment:   30   mins   Total Treatment:     30   mins    Dmitriy Rubalcava PT  Physical Therapist    Electronically signed 2025    [place] License: PT - [id number]

## 2025-02-20 ENCOUNTER — TREATMENT (OUTPATIENT)
Dept: PHYSICAL THERAPY | Facility: CLINIC | Age: 50
End: 2025-02-20
Payer: OTHER MISCELLANEOUS

## 2025-02-20 DIAGNOSIS — M25.511 ACUTE POSTOPERATIVE PAIN OF RIGHT SHOULDER: Primary | ICD-10-CM

## 2025-02-20 DIAGNOSIS — Z98.890 S/P RIGHT ROTATOR CUFF REPAIR: ICD-10-CM

## 2025-02-20 DIAGNOSIS — G89.18 ACUTE POSTOPERATIVE PAIN OF RIGHT SHOULDER: Primary | ICD-10-CM

## 2025-02-20 DIAGNOSIS — R29.898 WEAKNESS OF RIGHT SHOULDER: ICD-10-CM

## 2025-02-20 NOTE — PROGRESS NOTES
Progress Note      Patient: Shayna Abreu   : 1975  Diagnosis/ICD-10 Code:  Acute postoperative pain of right shoulder [G89.18, M25.511]  Referring practitioner: Dulce Sotelo MD  Date of Initial Visit: Type: THERAPY  Noted: 2025  Today's Date: 2025  Patient seen for 10 sessions      Subjective:   Subjective Questionnaire: QuickDASH: 30  Clinical Progress: improved  Home Program Compliance: Yes  Treatment has included: therapeutic exercise, neuromuscular re-education, manual therapy, and therapeutic activity    Subjective   Patient reports increased stiffness today and compliance with home program.   Objective          Observations     Right Shoulder  Positive for edema and incision.     Additional Shoulder Observation Details  Well fit surgical sling with independent mngmt    Passive Range of Motion     Right Shoulder   Flexion: 163 degrees   Abduction: 165 degrees   External rotation 0°: 63 degrees   Internal rotation 90°: WFL    Strength/Myotome Testing     Additional Strength Details  Not tested due to surgical precautions      See Exercise, Manual, and Modality Logs for complete treatment.     Assessment/Plan  Patient has progressed well since beginning skilled care. Significant improvement noted in both strength and ROM allowing for improved capacity to begin ceasing sling use, and light functional activities such as eating. They are still limited in strength, ROM and by increased pain which limit their ability to perform overhead tasks, lifting tasks, work tasks, home care, and ADLs. Further skilled care indicated at this time to address remaining deficits.     Progress toward previous goals: Partially Met   1. The patient has limited ROM of the right shoulder.    LTG 1: 12 weeks:  The patient will demonstrate 165 degrees of right shoulder flexion, 165 degrees of shoulder abduction, 80 degrees of shoulder external rotation, and 80 degrees of shoulder internal rotation to allow the  patient to reach into upper kitchen cabinets and manipulate clothing behind the back with greater ease.    STATUS:  progressing     STG 1a: 6 weeks:  The patient will demonstrate 125 degrees of right shoulder flexion, 125 degrees of shoulder abduction, 70 degrees of shoulder external rotation, and 70 degrees of shoulder internal rotation.    STATUS:  met      2. The patient has limited strength of the right shoulder.   LTG 2: 12 weeks:  The patient will demonstrate 4+/5 strength for right shoulder flexion, abduction, external rotation, and internal rotation in order to demonstrate improved shoulder stability.    STATUS:  progressing     STG 2a: 8 weeks:  The patient will demonstrate 3+/5 strength for right shoulder flexion, abduction, external rotation, and internal rotation.    STATUS:  met   STG2b:  2 weeks:  The patient will be independent with home exercises.     STATUS:  met     3. The patient complains of pain to the right shoulder.   LTG 3: 12 weeks:  The patient will report a pain rating of 1/10 or better in order to improve sleep quality and tolerance to performance of activities of daily living.    STATUS:  progressing     STG 3a: 8 weeks:  The patient will report a pain rating of 3/10 or better.     STATUS:  met    4. Carrying, Moving, and Handling Objects Functional Limitation     LTG 4: 12 weeks:  The patient will demonstrate 9.09 % limitation by achieving a score of 15/55 on the QuickDASH.    STATUS:  progressing     STG 4a: 6 weeks:  The patient will demonstrate 50 % limitation by achieving a score of 33/55 on the QuickDASH.      STATUS:  met   Recommendations: Continue as planned  Timeframe: 2 months  Prognosis to achieve goals: good    PT Signature: Dmitriy Rubalcava PT    Electronically signed 2/20/2025    [place] License: PT - [id number]       Timed:  Manual Therapy:    10     mins  05475;  Therapeutic Exercise:    10     mins  67290;     Neuromuscular Concha:    0    mins  35974;    Therapeutic Activity:     10      mins  22390;     Gait Trainin     mins  56521;     Aquatics                         0      mins  26708    Un-timed:  Mechanical Traction      0     mins  76099  Dry Needling     0     mins self-pay  Electrical Stimulation:    0     mins  17706 ( );  5 minutes re-eval 21526  Timed Treatment:   30   mins   Total Treatment:     35   mins

## 2025-02-24 ENCOUNTER — TREATMENT (OUTPATIENT)
Dept: PHYSICAL THERAPY | Facility: CLINIC | Age: 50
End: 2025-02-24
Payer: OTHER MISCELLANEOUS

## 2025-02-24 DIAGNOSIS — Z98.890 S/P RIGHT ROTATOR CUFF REPAIR: ICD-10-CM

## 2025-02-24 DIAGNOSIS — G89.18 ACUTE POSTOPERATIVE PAIN OF RIGHT SHOULDER: Primary | ICD-10-CM

## 2025-02-24 DIAGNOSIS — M25.511 ACUTE POSTOPERATIVE PAIN OF RIGHT SHOULDER: Primary | ICD-10-CM

## 2025-02-24 DIAGNOSIS — R29.898 WEAKNESS OF RIGHT SHOULDER: ICD-10-CM

## 2025-02-24 PROCEDURE — 97140 MANUAL THERAPY 1/> REGIONS: CPT | Performed by: PHYSICAL THERAPIST

## 2025-02-24 PROCEDURE — 97112 NEUROMUSCULAR REEDUCATION: CPT | Performed by: PHYSICAL THERAPIST

## 2025-02-24 PROCEDURE — 97110 THERAPEUTIC EXERCISES: CPT | Performed by: PHYSICAL THERAPIST

## 2025-02-24 NOTE — PROGRESS NOTES
Outpatient Physical Therapy  [address] [facility], [place] [address]                            Physical Therapy Daily Treatment Note    Patient: Shayna Abreu   : 1975  Diagnosis/ICD-10 Code:  Acute postoperative pain of right shoulder [G89.18, M25.511]  Referring practitioner: Dulce Sotelo MD  Date of Initial Visit: Type: THERAPY  Noted: 2025  Today's Date: 2025  Patient seen for 11 sessions           Subjective   Shayna Abreu reports: overall the shoulder is getting better, she is still sleeping on a wedge at night and usually with the ice machine on. States that she has ditched the sling, but is still careful with the right arm.     Objective   Minimal discomfort with theraband External Rotation.    See Exercise, Manual, and Modality Logs for complete treatment.     Assessment/Plan  Shayna progressing as evident by decreased overall R shoulder pain. Pt tolerated exercises and manual well,  minimal discomfort with theraband External Rotation . Pt would benefit from skilled PT to address Range of Motion  and Strength deficits with surgical protocol, pain management and any concerns with ADLs.       Progress per Plan of Care         Timed:  Manual Therapy:    10     mins  23868;  Therapeutic Exercise:    10     mins  44564;     Neuromuscular Concha:    10    mins  79529;    Therapeutic Activity:          mins  78430;     Gait Training:           mins  69503;      Untimed:  Electrical Stimulation:         mins  15386 ( );  Mechanical Traction:         mins  68285;     Timed Treatment:   30   mins   Total Treatment:     30   mins      Electronically signed:     Ary Barfield PTA  Physical Therapist Assistant  [facility] License #: [id number]

## 2025-02-27 ENCOUNTER — OFFICE VISIT (OUTPATIENT)
Dept: ORTHOPEDIC SURGERY | Facility: CLINIC | Age: 50
End: 2025-02-27
Payer: OTHER MISCELLANEOUS

## 2025-02-27 ENCOUNTER — TREATMENT (OUTPATIENT)
Dept: PHYSICAL THERAPY | Facility: CLINIC | Age: 50
End: 2025-02-27
Payer: OTHER MISCELLANEOUS

## 2025-02-27 ENCOUNTER — TELEPHONE (OUTPATIENT)
Dept: ORTHOPEDIC SURGERY | Facility: CLINIC | Age: 50
End: 2025-02-27
Payer: COMMERCIAL

## 2025-02-27 VITALS
OXYGEN SATURATION: 95 % | BODY MASS INDEX: 42.89 KG/M2 | WEIGHT: 282 LBS | SYSTOLIC BLOOD PRESSURE: 126 MMHG | HEART RATE: 90 BPM | DIASTOLIC BLOOD PRESSURE: 82 MMHG

## 2025-02-27 DIAGNOSIS — M25.511 RIGHT SHOULDER PAIN, UNSPECIFIED CHRONICITY: ICD-10-CM

## 2025-02-27 DIAGNOSIS — Z47.89 AFTERCARE FOLLOWING SURGERY OF THE MUSCULOSKELETAL SYSTEM: ICD-10-CM

## 2025-02-27 DIAGNOSIS — G89.18 ACUTE POSTOPERATIVE PAIN OF RIGHT SHOULDER: Primary | ICD-10-CM

## 2025-02-27 DIAGNOSIS — M25.511 ACUTE POSTOPERATIVE PAIN OF RIGHT SHOULDER: Primary | ICD-10-CM

## 2025-02-27 DIAGNOSIS — R29.898 WEAKNESS OF RIGHT SHOULDER: ICD-10-CM

## 2025-02-27 DIAGNOSIS — Z98.890 S/P RIGHT ROTATOR CUFF REPAIR: ICD-10-CM

## 2025-02-27 DIAGNOSIS — Z98.890 S/P ARTHROSCOPY OF RIGHT SHOULDER: Primary | ICD-10-CM

## 2025-02-27 NOTE — PROGRESS NOTES
Physical Therapy Daily Treatment Note                          Patient: Shayna Abreu   : 1975  Diagnosis/ICD-10 Code:  Acute postoperative pain of right shoulder [G89.18, M25.511]  Referring practitioner: Dulce Sotelo MD  Date of Initial Visit: No linked episodes  Today's Date: 2025  Patient seen for Visit count could not be calculated. Make sure you are using a visit which is associated with an episode. sessions           Subjective   The patient reported no new complaints    Objective   See Exercise, Manual, and Modality Logs for complete treatment.     Assessment/Plan     Patient tolerated today's treatment without complaints of pain. Began 6 week phase of protocol with isometrics tolerated well. Strength, ROM, and increased pain with activity deficits still limits patient's ability to perform ADLs. Further skilled care indicated at this time.     Timed:  Manual Therapy:    10     mins  57292;  Therapeutic Exercise:    10     mins  83570;     Neuromuscular Concha:   10    mins  39625;    Therapeutic Activity:     0     mins  41224;     Gait Trainin     mins  75359;     Aquatics                         0      mins  17368    Un-timed:  Mechanical Traction      0     mins  90364  Dry Needling     0     mins self-pay  Electrical Stimulation:    0     mins  83148 ( );      Timed Treatment:   30   mins   Total Treatment:     30   mins    Dmitriy Rubalcava PT  Physical Therapist    Electronically signed 2025    [place] License: PT - [id number]

## 2025-02-27 NOTE — PROGRESS NOTES
Chief Complaint  Follow-up of the Right Shoulder    Subjective      Shayna Abreu presents to [facility] for follow up of their right shoulder.  She is 6 weeks status post right shoulder arthroscopy with rotator cuff repair, subacromial decompression and distal claviculectomy performed Dr. Sotelo on 1/15/2025.  She is attending physical therapy at [facility].  He returns to clinic today without her sling.  Overall she reports she is doing very well.  She is attending physical therapy twice weekly and doing her home exercises.  She states that her pain has been manageable with over-the-counter anti-inflammatory medications.  This is a workers comp case.    Allergies   Allergen Reactions    Penicillins Hives     AS A CHILD       Objective     Vital Signs:   Vitals:    02/27/25 0825   BP: 126/82   Pulse: 90   SpO2: 95%   Weight: 128 kg (282 lb)     Body mass index is 42.89 kg/m².    I reviewed the patient's chief complaint, history of present illness, review of systems, past medical history, surgical history, family history, social history, medications, and allergy list.     Ortho Exam  Shoulder   General: Alert. No acute distress.  Right upper Extremity: not tender to palpation. No skin discoloration, atrophy, or swelling.   110 degrees active elevation.  160 degrees passive forward shoulder elevation.  External rotation to 65 degrees. Demonstrates intact active elbow ROM. Demonstrates intact active wrist ROM. Sensation intact. Palpable radial pulse. Neurovascularly intact.            Imaging Results (Most Recent)       None                Assessment and Plan   Diagnoses and all orders for this visit:    1. S/P arthroscopy of right shoulder with AKASH MATOS DC (Primary)  -     Ambulatory Referral to Physical Therapy for Evaluation & Treatment    2. Aftercare following surgery of the musculoskeletal system  -     Ambulatory Referral to Physical Therapy for Evaluation &  Treatment    3. Right shoulder pain, unspecified chronicity  -     Ambulatory Referral to Physical Therapy for Evaluation & Treatment         Shayna Abreu is here today following up 6 weeks status post rotator cuff repair with subacromial decompression and distal claviculectomy, performed by Dr Sotelo on 1/15/2025. Patient may discontinue sling use. Incisions site well-healed.     Continue physical therapy following the rotator cuff repair protocol.  Continue to avoid lifting, pushing, or pulling with affected arm unless advised to advance with PT. Continue icing shoulder up to 3-4 times daily for no longer than 15 to 20 minutes at a time as needed for pain or swelling.    Continue light duty restrictions.  Work note written.    Follow-up in 6 weeks. No imaging needed.     Call with changes or concerns.           Tobacco Use: Medium Risk (2/27/2025)    Patient History     Smoking Tobacco Use: Former     Smokeless Tobacco Use: Never     Passive Exposure: Past     Patient reports they have a history of tobacco use; encouraged continued tobacco cessation for further health benefits.            Follow Up   Return in about 6 weeks (around 4/10/2025).  There are no Patient Instructions on file for this visit.    Patient was given instructions and counseling regarding her condition or for health maintenance advice. Please see specific information pulled into the AVS if appropriate.     Dictated Utilizing Dragon Dictation. Please note that portions of this note were completed with a voice recognition program. Part of this note may be an electronic transcription/translation of spoken language to printed text using the Dragon Dictation System.

## 2025-03-03 ENCOUNTER — TREATMENT (OUTPATIENT)
Dept: PHYSICAL THERAPY | Facility: CLINIC | Age: 50
End: 2025-03-03
Payer: OTHER MISCELLANEOUS

## 2025-03-03 DIAGNOSIS — G89.18 ACUTE POSTOPERATIVE PAIN OF RIGHT SHOULDER: Primary | ICD-10-CM

## 2025-03-03 DIAGNOSIS — R29.898 WEAKNESS OF RIGHT SHOULDER: ICD-10-CM

## 2025-03-03 DIAGNOSIS — M25.511 ACUTE POSTOPERATIVE PAIN OF RIGHT SHOULDER: Primary | ICD-10-CM

## 2025-03-03 DIAGNOSIS — Z98.890 S/P RIGHT ROTATOR CUFF REPAIR: ICD-10-CM

## 2025-03-03 PROCEDURE — 97110 THERAPEUTIC EXERCISES: CPT

## 2025-03-03 PROCEDURE — 97140 MANUAL THERAPY 1/> REGIONS: CPT

## 2025-03-03 PROCEDURE — 97112 NEUROMUSCULAR REEDUCATION: CPT

## 2025-03-03 NOTE — PROGRESS NOTES
Physical Therapy Daily Treatment Note                          Patient: Shayna Abreu   : 1975  Diagnosis/ICD-10 Code:  Acute postoperative pain of right shoulder [G89.18, M25.511]  Referring practitioner: Dulce Sotelo MD  Date of Initial Visit: Type: THERAPY  Noted: 2025  Today's Date: 3/3/2025  Patient seen for 13 sessions           Subjective   The patient reported no new complaints    Objective   See Exercise, Manual, and Modality Logs for complete treatment.     Assessment/Plan     Patient tolerated today's treatment without complaints of pain She is tolerating strengthening progressions well without compensations. Strength, ROM, and increased pain with activity deficits still limits patient's ability to perform ADLs. Further skilled care indicated at this time.       Timed:  Manual Therapy:    10     mins  83275;  Therapeutic Exercise:    10     mins  32844;     Neuromuscular Concha:   10    mins  76270;    Therapeutic Activity:     0     mins  39108;     Gait Trainin     mins  56131;     Aquatics                         0      mins  81246    Un-timed:  Mechanical Traction      0     mins  91314  Dry Needling     0     mins self-pay  Electrical Stimulation:    0     mins  91260 ( );      Timed Treatment:   30   mins   Total Treatment:     30   mins    Dmitriy Rubalcava PT  Physical Therapist    Electronically signed 3/3/2025    [place] License: PT - [id number]

## 2025-03-04 DIAGNOSIS — S49.91XA INJURY OF RIGHT SHOULDER, INITIAL ENCOUNTER: ICD-10-CM

## 2025-03-04 DIAGNOSIS — S46.011D TRAUMATIC INCOMPLETE TEAR OF RIGHT ROTATOR CUFF, SUBSEQUENT ENCOUNTER: ICD-10-CM

## 2025-03-04 DIAGNOSIS — M25.511 RIGHT SHOULDER PAIN, UNSPECIFIED CHRONICITY: ICD-10-CM

## 2025-03-05 RX ORDER — MELOXICAM 15 MG/1
15 TABLET ORAL DAILY
Qty: 30 TABLET | Refills: 0 | Status: SHIPPED | OUTPATIENT
Start: 2025-03-05

## 2025-03-06 ENCOUNTER — TREATMENT (OUTPATIENT)
Dept: PHYSICAL THERAPY | Facility: CLINIC | Age: 50
End: 2025-03-06
Payer: OTHER MISCELLANEOUS

## 2025-03-06 DIAGNOSIS — M25.511 ACUTE POSTOPERATIVE PAIN OF RIGHT SHOULDER: ICD-10-CM

## 2025-03-06 DIAGNOSIS — G89.18 ACUTE POSTOPERATIVE PAIN OF RIGHT SHOULDER: ICD-10-CM

## 2025-03-06 DIAGNOSIS — Z98.890 S/P RIGHT ROTATOR CUFF REPAIR: Primary | ICD-10-CM

## 2025-03-06 DIAGNOSIS — R29.898 WEAKNESS OF RIGHT SHOULDER: ICD-10-CM

## 2025-03-06 NOTE — PROGRESS NOTES
[facility]                              Physical Therapy Daily Treatment Note    Patient: Shayna Abreu   : 1975  Diagnosis/ICD-10 Code:  S/P right rotator cuff repair [Z98.890]  Referring practitioner: Dulce Sotelo MD  Date of Initial Visit: Type: THERAPY  Noted: 2025  Today's Date: 3/6/2025  Patient seen for 14 sessions           Subjective   Shayna Abreu reports: she's been able to reach in her closet to get her jeans down. Pt did play bunko yesterday and was reaching with her right arm and is a little sore today but overall feeling great.       Objective       See Exercise, Manual, and Modality Logs for complete treatment.     Assessment/Plan  Shayna progressing as evident by decreased overall right shoulder pain. Pt tolerated exercises well, with no complaints of increased pain or discomfort. Patient will continue to benefit from skilled physical therapy services to further address pain management,  their deficits in strength, and range of motion in order to improve upon their functional mobility for any ADL concerns.      Progress per Plan of Care         Timed:  Manual Therapy:         mins  65650;  Therapeutic Exercise:    10     mins  30927;     Neuromuscular Concha:    8    mins  46465;    Therapeutic Activity:     10     mins  90393;     Gait Training:           mins  53513;        Untimed:  Electrical Stimulation:         mins  43965 ( );  Mechanical Traction:         mins  83060;       Timed Treatment:   28   mins   Total Treatment:     28   mins      Electronically signed:     Avis Irby PTA  Physical Therapist Assistant  [facility] License #: [id number]

## 2025-03-10 ENCOUNTER — TREATMENT (OUTPATIENT)
Dept: PHYSICAL THERAPY | Facility: CLINIC | Age: 50
End: 2025-03-10
Payer: OTHER MISCELLANEOUS

## 2025-03-10 DIAGNOSIS — R29.898 WEAKNESS OF RIGHT SHOULDER: ICD-10-CM

## 2025-03-10 DIAGNOSIS — Z98.890 S/P RIGHT ROTATOR CUFF REPAIR: ICD-10-CM

## 2025-03-10 DIAGNOSIS — M25.511 ACUTE POSTOPERATIVE PAIN OF RIGHT SHOULDER: Primary | ICD-10-CM

## 2025-03-10 DIAGNOSIS — G89.18 ACUTE POSTOPERATIVE PAIN OF RIGHT SHOULDER: Primary | ICD-10-CM

## 2025-03-10 PROCEDURE — 97530 THERAPEUTIC ACTIVITIES: CPT

## 2025-03-10 PROCEDURE — 97112 NEUROMUSCULAR REEDUCATION: CPT

## 2025-03-10 PROCEDURE — 97110 THERAPEUTIC EXERCISES: CPT

## 2025-03-10 NOTE — PROGRESS NOTES
Physical Therapy Daily Treatment Note                          Patient: Shayna Abreu   : 1975  Diagnosis/ICD-10 Code:  Acute postoperative pain of right shoulder [G89.18, M25.511]  Referring practitioner: Dulce Sotelo MD  Date of Initial Visit: No linked episodes  Today's Date: 3/10/2025  Patient seen for Visit count could not be calculated. Make sure you are using a visit which is associated with an episode. sessions           Subjective   The patient reported no new complaints    Objective   See Exercise, Manual, and Modality Logs for complete treatment.     Assessment/Plan     Patient tolerated today's treatment without complaints of pain. Able to progress to standing AAROM tasks with cane. Strength, ROM, and increased pain with activity deficits still limits patient's ability to perform ADLs. Further skilled care indicated at this time.       Timed:  Manual Therapy:    0     mins  91954;  Therapeutic Exercise:    10     mins  87272;     Neuromuscular Concha:   10    mins  18872;    Therapeutic Activity:     10     mins  34993;     Gait Trainin     mins  55905;     Aquatics                         0      mins  65258    Un-timed:  Mechanical Traction      0     mins  08572  Dry Needling     0     mins self-pay  Electrical Stimulation:    0     mins  30138 ( );      Timed Treatment:   30   mins   Total Treatment:     30   mins    Dmitriy Rubalcava PT  Physical Therapist    Electronically signed 3/10/2025    [place] License: PT - [id number]

## 2025-03-13 ENCOUNTER — TREATMENT (OUTPATIENT)
Dept: PHYSICAL THERAPY | Facility: CLINIC | Age: 50
End: 2025-03-13
Payer: OTHER MISCELLANEOUS

## 2025-03-13 DIAGNOSIS — M25.511 ACUTE POSTOPERATIVE PAIN OF RIGHT SHOULDER: Primary | ICD-10-CM

## 2025-03-13 DIAGNOSIS — R29.898 WEAKNESS OF RIGHT SHOULDER: ICD-10-CM

## 2025-03-13 DIAGNOSIS — Z98.890 S/P RIGHT ROTATOR CUFF REPAIR: ICD-10-CM

## 2025-03-13 DIAGNOSIS — G89.18 ACUTE POSTOPERATIVE PAIN OF RIGHT SHOULDER: Primary | ICD-10-CM

## 2025-03-13 NOTE — PROGRESS NOTES
[facility]                              Physical Therapy Daily Treatment Note    Patient: Shayna Abreu   : 1975  Diagnosis/ICD-10 Code:  Acute postoperative pain of right shoulder [G89.18, M25.511]  Referring practitioner: Dulce Sotelo MD  Date of Initial Visit: Type: THERAPY  Noted: 2025  Today's Date: 3/13/2025  Patient seen for 16 sessions           Subjective   Shayna Abreu reports: no new complaints at time of therapy.       Objective     See Exercise, Manual, and Modality Logs for complete treatment.     Assessment/Plan  Shayna progressing as evident by decreased overall right shoulder pain. Pt tolerated exercises well, with no complaints of increased pain or discomfort. Patient will continue to benefit from skilled physical therapy services to further address pain management,  their deficits in strength, and range of motion in order to improve upon their functional mobility for any ADL concerns.      Progress per Plan of Care         Timed:  Manual Therapy:         mins  87293;  Therapeutic Exercise:    10     mins  95372;     Neuromuscular Concha:    8    mins  45139;    Therapeutic Activity:     10     mins  15935;     Gait Training:           mins  37691;        Untimed:  Electrical Stimulation:         mins  20463 ( );  Mechanical Traction:         mins  00210;       Timed Treatment:   28   mins   Total Treatment:     28   mins      Electronically signed:     Avis Irby PTA  Physical Therapist Assistant  [facility] License #: [id number]

## 2025-03-17 ENCOUNTER — TREATMENT (OUTPATIENT)
Dept: PHYSICAL THERAPY | Facility: CLINIC | Age: 50
End: 2025-03-17
Payer: OTHER MISCELLANEOUS

## 2025-03-17 DIAGNOSIS — G89.18 ACUTE POSTOPERATIVE PAIN OF RIGHT SHOULDER: Primary | ICD-10-CM

## 2025-03-17 DIAGNOSIS — R29.898 WEAKNESS OF RIGHT SHOULDER: ICD-10-CM

## 2025-03-17 DIAGNOSIS — M25.511 ACUTE POSTOPERATIVE PAIN OF RIGHT SHOULDER: Primary | ICD-10-CM

## 2025-03-17 DIAGNOSIS — Z98.890 S/P RIGHT ROTATOR CUFF REPAIR: ICD-10-CM

## 2025-03-17 NOTE — PROGRESS NOTES
[facility]                              Physical Therapy Daily Treatment Note    Patient: Shayna Abreu   : 1975  Diagnosis/ICD-10 Code:  Acute postoperative pain of right shoulder [G89.18, M25.511]  Referring practitioner: Dulce Sotelo MD  Date of Initial Visit: Type: THERAPY  Noted: 2025  Today's Date: 3/17/2025  Patient seen for 17 sessions           Subjective   Shayna Abreu reports: no complaints at time of therapy.      Objective     See Exercise, Manual, and Modality Logs for complete treatment.     Assessment/Plan  Shayna progressing as evident by decreased overall right shoulder pain. Pt tolerated strengthening exercises well, with no complaints of increased pain or discomfort. Patient will continue to benefit from skilled physical therapy services to further address  their deficits in strength, and range of motion in order to improve upon their functional mobility for any ADL concerns.      Progress per Plan of Care         Timed:  Manual Therapy:         mins  67709;  Therapeutic Exercise:    10     mins  49627;     Neuromuscular Concha:    8    mins  64412;    Therapeutic Activity:     10     mins  30106;     Gait Training:           mins  70084;        Untimed:  Electrical Stimulation:         mins  59131 ( );  Mechanical Traction:         mins  71756;       Timed Treatment:   28   mins   Total Treatment:     28   mins      Electronically signed:     Avis Irby PTA  Physical Therapist Assistant  [facility] License #: [id number]

## 2025-03-18 ENCOUNTER — TELEPHONE (OUTPATIENT)
Dept: SURGERY | Facility: CLINIC | Age: 50
End: 2025-03-18
Payer: COMMERCIAL

## 2025-03-18 NOTE — TELEPHONE ENCOUNTER
"Provider: SUKHJINDER    Caller: Shayna Abreu \"Fanny\"    Relationship to Patient: Self    Phone Number: [phone]    Reason for Call: PATIENT STATES EGD WAS TO BE ON 10 YR RECALL NOT 2 YR  RECALL.  IS A 2 YR EGD NEEDED?  "

## 2025-03-18 NOTE — TELEPHONE ENCOUNTER
Per Dr. Guajardo, she does not need a 2-year recall for an EGD. She did not have evidence of Berger's esophagus on her biopsy. A 10-year recall would be adequate.

## 2025-03-20 ENCOUNTER — TREATMENT (OUTPATIENT)
Dept: PHYSICAL THERAPY | Facility: CLINIC | Age: 50
End: 2025-03-20
Payer: OTHER MISCELLANEOUS

## 2025-03-20 DIAGNOSIS — G89.18 ACUTE POSTOPERATIVE PAIN OF RIGHT SHOULDER: Primary | ICD-10-CM

## 2025-03-20 DIAGNOSIS — Z98.890 S/P RIGHT ROTATOR CUFF REPAIR: ICD-10-CM

## 2025-03-20 DIAGNOSIS — R29.898 WEAKNESS OF RIGHT SHOULDER: ICD-10-CM

## 2025-03-20 DIAGNOSIS — M25.511 ACUTE POSTOPERATIVE PAIN OF RIGHT SHOULDER: Primary | ICD-10-CM

## 2025-03-20 NOTE — PROGRESS NOTES
Progress Note      Patient: Shayna Abreu   : 1975  Diagnosis/ICD-10 Code:  Acute postoperative pain of right shoulder [G89.18, M25.511]  Referring practitioner: Dulce Sotelo MD  Date of Initial Visit: Type: THERAPY  Noted: 2025  Today's Date: 3/20/2025  Patient seen for 18 sessions      Subjective:   Subjective Questionnaire: QuickDASH: 18  Clinical Progress: improved  Home Program Compliance: Yes  Treatment has included: therapeutic exercise, neuromuscular re-education, manual therapy, and therapeutic activity    Subjective   Patient reports compliance with home program. She is still having intermittent tingling in the hand, she feels her motion is good but notes weakness in the shoulder.   Objective          Observations     Additional Shoulder Observation Details  Well fit surgical sling with independent mngmt    Active Range of Motion     Right Shoulder   Flexion: 163 degrees   Abduction: 165 degrees   External rotation 0°: 72 degrees   External rotation BTH: WFL  Internal rotation BTB: WFL    Passive Range of Motion     Right Shoulder   Flexion: 163 degrees   Abduction: 165 degrees   External rotation 0°: 73 degrees   Internal rotation 90°: WFL    Strength/Myotome Testing     Right Shoulder     Planes of Motion   Flexion: 4-   Abduction: 4   External rotation at 0°: 4+   Internal rotation at 0°: 5     Isolated Muscles   Biceps: 4     Additional Strength Details  Not tested due to surgical precautions    Functional Assessment     Comments  R UT compensation during 3lb OH press      See Exercise, Manual, and Modality Logs for complete treatment.     Assessment/Plan  Patient has progressed well since beginning skilled care. Significant improvement noted in both strength and ROM allowing for improved capacity to begin use of RUE for light activities below shoulder height. They are still limited in strength, ROM and by increased pain which limit their ability to perform overhead tasks, lifting  tasks, work tasks, home care, and ADLs. Further skilled care indicated at this time to address remaining deficits.     Progress toward previous goals: Partially Met   1. The patient has limited ROM of the right shoulder.    LTG 1: 12 weeks:  The patient will demonstrate 165 degrees of right shoulder flexion, 165 degrees of shoulder abduction, 80 degrees of shoulder external rotation, and 80 degrees of shoulder internal rotation to allow the patient to reach into upper kitchen cabinets and manipulate clothing behind the back with greater ease.    STATUS: met     STG 1a: 6 weeks:  The patient will demonstrate 125 degrees of right shoulder flexion, 125 degrees of shoulder abduction, 70 degrees of shoulder external rotation, and 70 degrees of shoulder internal rotation.    STATUS:  met      2. The patient has limited strength of the right shoulder.   LTG 2: 12 weeks:  The patient will demonstrate 4+/5 strength for right shoulder flexion, abduction, external rotation, and internal rotation in order to demonstrate improved shoulder stability.    STATUS:  progressing     STG 2a: 8 weeks:  The patient will demonstrate 3+/5 strength for right shoulder flexion, abduction, external rotation, and internal rotation.    STATUS:  met   STG2b:  2 weeks:  The patient will be independent with home exercises.     STATUS:  met     3. The patient complains of pain to the right shoulder.   LTG 3: 12 weeks:  The patient will report a pain rating of 1/10 or better in order to improve sleep quality and tolerance to performance of activities of daily living.    STATUS:  progressing     STG 3a: 8 weeks:  The patient will report a pain rating of 3/10 or better.     STATUS:  met    4. Carrying, Moving, and Handling Objects Functional Limitation     LTG 4: 12 weeks:  The patient will demonstrate 9.09 % limitation by achieving a score of 15/55 on the QuickDASH.    STATUS:  progressing     STG 4a: 6 weeks:  The patient will demonstrate 50 %  limitation by achieving a score of 33/55 on the QuickDASH.      STATUS:  met   Recommendations: Continue as planned  Timeframe: 1 month-focus on strengthening  Prognosis to achieve goals: good    PT Signature: Dmitriy Rubalcava, PT    Electronically signed 3/20/2025    [place] License: PT - [id number]       Timed:  Manual Therapy:    0     mins  20868;  Therapeutic Exercise:    13     mins  96422;     Neuromuscular Concha:    10    mins  01715;    Therapeutic Activity:     10     mins  79943;     Gait Trainin     mins  88561;     Aquatics                         0      mins  69560    Un-timed:  Mechanical Traction      0     mins  46234  Dry Needling     0     mins self-pay  Electrical Stimulation:    0     mins  49319 ( );  5 minutes re-eval 11021  Timed Treatment:   33   mins   Total Treatment:     38   mins

## 2025-03-24 ENCOUNTER — TREATMENT (OUTPATIENT)
Dept: PHYSICAL THERAPY | Facility: CLINIC | Age: 50
End: 2025-03-24
Payer: OTHER MISCELLANEOUS

## 2025-03-24 DIAGNOSIS — G89.18 ACUTE POSTOPERATIVE PAIN OF RIGHT SHOULDER: Primary | ICD-10-CM

## 2025-03-24 DIAGNOSIS — R29.898 WEAKNESS OF RIGHT SHOULDER: ICD-10-CM

## 2025-03-24 DIAGNOSIS — M25.511 ACUTE POSTOPERATIVE PAIN OF RIGHT SHOULDER: Primary | ICD-10-CM

## 2025-03-24 DIAGNOSIS — Z98.890 S/P RIGHT ROTATOR CUFF REPAIR: ICD-10-CM

## 2025-03-24 PROCEDURE — 97530 THERAPEUTIC ACTIVITIES: CPT

## 2025-03-24 PROCEDURE — 97110 THERAPEUTIC EXERCISES: CPT

## 2025-03-24 PROCEDURE — 97112 NEUROMUSCULAR REEDUCATION: CPT

## 2025-03-24 NOTE — PROGRESS NOTES
Physical Therapy Daily Treatment Note                          Patient: Shayna Abreu   : 1975  Diagnosis/ICD-10 Code:  Acute postoperative pain of right shoulder [G89.18, M25.511]  Referring practitioner: Dulce Sotelo MD  Date of Initial Visit: Type: THERAPY  Noted: 2025  Today's Date: 3/24/2025  Patient seen for 19 sessions           Subjective   The patient reported no complaints    Objective   See Exercise, Manual, and Modality Logs for complete treatment.     Assessment/Plan     Patient tolerated today's treatment without complaints of pain. Strengthening continues to be tolerated well. Strength, ROM, and increased pain with activity deficits still limits patient's ability to perform ADLs. Further skilled care indicated at this time.       Timed:  Manual Therapy:    0     mins  54289;  Therapeutic Exercise:    10     mins  48571;     Neuromuscular Concha:   12    mins  22471;    Therapeutic Activity:     10     mins  09736;     Gait Trainin     mins  03761;     Aquatics                         0      mins  80514    Un-timed:  Mechanical Traction      0     mins  77860  Dry Needling     0     mins self-pay  Electrical Stimulation:    0     mins  19389 ( );      Timed Treatment:   32   mins   Total Treatment:     32   mins    Dmitriy Rubalcava PT  Physical Therapist    Electronically signed 3/24/2025    [place] License: PT - [id number]

## 2025-03-27 ENCOUNTER — OFFICE VISIT (OUTPATIENT)
Dept: ORTHOPEDIC SURGERY | Facility: CLINIC | Age: 50
End: 2025-03-27
Payer: COMMERCIAL

## 2025-03-27 ENCOUNTER — TREATMENT (OUTPATIENT)
Dept: PHYSICAL THERAPY | Facility: CLINIC | Age: 50
End: 2025-03-27
Payer: OTHER MISCELLANEOUS

## 2025-03-27 VITALS
BODY MASS INDEX: 42.74 KG/M2 | HEIGHT: 68 IN | SYSTOLIC BLOOD PRESSURE: 138 MMHG | WEIGHT: 282 LBS | HEART RATE: 78 BPM | OXYGEN SATURATION: 95 % | DIASTOLIC BLOOD PRESSURE: 84 MMHG

## 2025-03-27 DIAGNOSIS — G89.18 ACUTE POSTOPERATIVE PAIN OF RIGHT SHOULDER: Primary | ICD-10-CM

## 2025-03-27 DIAGNOSIS — Z98.890 S/P RIGHT ROTATOR CUFF REPAIR: ICD-10-CM

## 2025-03-27 DIAGNOSIS — Z98.890 S/P ARTHROSCOPY OF RIGHT SHOULDER: Primary | ICD-10-CM

## 2025-03-27 DIAGNOSIS — R29.898 WEAKNESS OF RIGHT SHOULDER: ICD-10-CM

## 2025-03-27 DIAGNOSIS — M25.511 ACUTE POSTOPERATIVE PAIN OF RIGHT SHOULDER: Primary | ICD-10-CM

## 2025-03-27 DIAGNOSIS — M25.511 RIGHT SHOULDER PAIN, UNSPECIFIED CHRONICITY: ICD-10-CM

## 2025-03-27 PROCEDURE — 99024 POSTOP FOLLOW-UP VISIT: CPT

## 2025-03-27 NOTE — PROGRESS NOTES
Physical Therapy Daily Treatment Note                          Patient: Shayna Abreu   : 1975  Diagnosis/ICD-10 Code:  Acute postoperative pain of right shoulder [G89.18, M25.511]  Referring practitioner: Dulce Sotelo MD  Date of Initial Visit: No linked episodes  Today's Date: 3/27/2025  Patient seen for Visit count could not be calculated. Make sure you are using a visit which is associated with an episode. sessions           Subjective   The patient reported no new complaints    Objective   See Exercise, Manual, and Modality Logs for complete treatment.     Assessment/Plan     Patient tolerated today's treatment without complaints of pain. Strengthening progression continues to be tolerated well. Strength, ROM, and increased pain with activity deficits still limits patient's ability to perform work tasks and ADLs. Further skilled care indicated at this time.       Timed:  Manual Therapy:    0     mins  05873;  Therapeutic Exercise:    10     mins  63318;     Neuromuscular Concha:   10    mins  12817;    Therapeutic Activity:     10     mins  38486;     Gait Trainin     mins  02229;     Aquatics                         0      mins  35325    Un-timed:  Mechanical Traction      0     mins  83367  Dry Needling     0     mins self-pay  Electrical Stimulation:    0     mins  80988 ( );      Timed Treatment:   30   mins   Total Treatment:     30   mins    Dmitriy Rubalcava PT  Physical Therapist    Electronically signed 3/27/2025    [place] License: PT - [id number]

## 2025-03-27 NOTE — PROGRESS NOTES
"Chief Complaint  Follow-up of the Right Shoulder    Subjective      Shayna Abreu presents to [facility]     History of Present Illness  The patient is here today for a follow-up visit regarding her worker's compensation case. She is 10 weeks post right shoulder rotator cuff repair with subacromial decompression and distal claviculectomy performed on 01/15/2025.    She has been attending physical therapy at [facility], focusing primarily on strengthening exercises. According to the physical therapy notes, she reports no pain, and her strength is improving. She is scheduled for approximately 4 more physical therapy sessions. She reports a general sense of well-being and perceives her therapy as beneficial. However, she experiences difficulty when attempting to lift a 5-pound weight straight up, which causes discomfort in her biceps area. Despite this, she maintains a good range of motion. She also notes a decrease in her pulling capacity from 65 to 70 before to 45 currently. She continues to perform 40 repetitions of outward band pulls. She was able to discontinue the use of her sling after 2 weeks. She occasionally experiences tingling in her fingers, which she manages with stretching exercises.  She reports no work restrictions.      Allergies   Allergen Reactions    Penicillins Hives     AS A CHILD       Objective     Vital Signs:   Vitals:    03/27/25 0827   BP: 138/84   Pulse: 78   SpO2: 95%   Weight: 128 kg (282 lb)   Height: 172.7 cm (67.99\")     Body mass index is 42.89 kg/m².    I reviewed the patient's chief complaint, history of present illness, review of systems, past medical history, surgical history, family history, social history, medications, and allergy list.     Ortho Exam  Shoulder   General: Alert. No acute distress.  Right Upper Extremity:  Incision well-healing without redness, warmth or active drainage. not tender to palpation. No skin discoloration, " atrophy, or swelling. 180 degrees active elevation. External rotation to 55 degrees. Internal rotation to mid thoracic.  Demonstrates intact active elbow ROM. Demonstrates intact active wrist ROM. Sensation intact. Palpable radial pulse. Neurovascularly intact.                Imaging Results (Most Recent)       None             Results           Assessment and Plan   Diagnoses and all orders for this visit:    1. S/P arthroscopy of right shoulder with RCR, SAD, DC (Primary)    2. Right shoulder pain, unspecified chronicity         Assessment & Plan  1. Post-operative status following right shoulder rotator cuff repair.  She is demonstrating satisfactory progress in terms of strength development. She reports no significant pain and is tolerating the exercises well. She has been attending physical therapy at [facility] and has approximately 4 more visits scheduled. She is advised to complete these remaining physical therapy sessions and subsequently transition to a home exercise program. If she feels the need for additional physical therapy, she can contact the office to arrange further sessions.    Follow-up  The patient will follow up in 3 months.    PROCEDURE  The patient underwent right shoulder rotator cuff repair with subacromial decompression and distal claviculectomy on 01/15/2025.      Follow up in 3 months for reevaluation.  Will address MMI at that time.         Tobacco Use: Medium Risk (3/27/2025)    Patient History     Smoking Tobacco Use: Former     Smokeless Tobacco Use: Never     Passive Exposure: Past     Patient reports they have a history of tobacco use; encouraged continued tobacco cessation for further health benefits.            Follow Up   Return in about 3 months (around 6/27/2025).  There are no Patient Instructions on file for this visit.    Patient was given instructions and counseling regarding her condition or for health maintenance advice. Please see specific information  pulled into the AVS if appropriate.     Patient or patient representative verbalized consent for the use of Ambient Listening during the visit with  SUKUMAR Trevizo for chart documentation. 3/30/2025  13:29 EDT    Dictated Utilizing Dragon Dictation. Please note that portions of this note were completed with a voice recognition program. Part of this note may be an electronic transcription/translation of spoken language to printed text using the Dragon Dictation System.

## 2025-03-31 ENCOUNTER — TREATMENT (OUTPATIENT)
Dept: PHYSICAL THERAPY | Facility: CLINIC | Age: 50
End: 2025-03-31
Payer: OTHER MISCELLANEOUS

## 2025-03-31 DIAGNOSIS — M25.511 ACUTE POSTOPERATIVE PAIN OF RIGHT SHOULDER: Primary | ICD-10-CM

## 2025-03-31 DIAGNOSIS — R29.898 WEAKNESS OF RIGHT SHOULDER: ICD-10-CM

## 2025-03-31 DIAGNOSIS — G89.18 ACUTE POSTOPERATIVE PAIN OF RIGHT SHOULDER: Primary | ICD-10-CM

## 2025-03-31 DIAGNOSIS — Z98.890 S/P RIGHT ROTATOR CUFF REPAIR: ICD-10-CM

## 2025-03-31 PROCEDURE — 97112 NEUROMUSCULAR REEDUCATION: CPT

## 2025-03-31 PROCEDURE — 97110 THERAPEUTIC EXERCISES: CPT

## 2025-03-31 PROCEDURE — 97530 THERAPEUTIC ACTIVITIES: CPT

## 2025-03-31 NOTE — PROGRESS NOTES
Physical Therapy Daily Treatment Note                          Patient: Shayna Abreu   : 1975  Diagnosis/ICD-10 Code:  Acute postoperative pain of right shoulder [G89.18, M25.511]  Referring practitioner: Dulce Sotelo MD  Date of Initial Visit: Type: THERAPY  Noted: 2025  Today's Date: 3/31/2025  Patient seen for 21 sessions           Subjective   The patient reported increased shoulder pain in the front aspect of the shoulder yesterday she is not sure why.     Objective   See Exercise, Manual, and Modality Logs for complete treatment.     Assessment/Plan     Patient tolerated today's treatment without complaints of pain. Improvements noted in anterior shoulder pain following doorway stretches. Strength, ROM, and increased pain with activity deficits still limits patient's ability to perform ADLs. Further skilled care indicated at this time.         Timed:  Manual Therapy:    0     mins  65988;  Therapeutic Exercise:    10     mins  32567;     Neuromuscular Concha:   8    mins  22474;    Therapeutic Activity:     10     mins  79205;     Gait Trainin     mins  93533;     Aquatics                         0      mins  71543    Un-timed:  Mechanical Traction      0     mins  94534  Dry Needling     0     mins self-pay  Electrical Stimulation:    0     mins  89852 ( );      Timed Treatment:   28   mins   Total Treatment:     28   mins    Dmitriy Rubalcava PT  Physical Therapist    Electronically signed 3/31/2025    [place] License: PT - [id number]

## 2025-04-03 ENCOUNTER — TREATMENT (OUTPATIENT)
Dept: PHYSICAL THERAPY | Facility: CLINIC | Age: 50
End: 2025-04-03
Payer: OTHER MISCELLANEOUS

## 2025-04-03 DIAGNOSIS — M25.511 ACUTE POSTOPERATIVE PAIN OF RIGHT SHOULDER: Primary | ICD-10-CM

## 2025-04-03 DIAGNOSIS — G89.18 ACUTE POSTOPERATIVE PAIN OF RIGHT SHOULDER: Primary | ICD-10-CM

## 2025-04-03 DIAGNOSIS — Z98.890 S/P RIGHT ROTATOR CUFF REPAIR: ICD-10-CM

## 2025-04-03 DIAGNOSIS — R29.898 WEAKNESS OF RIGHT SHOULDER: ICD-10-CM

## 2025-04-03 NOTE — PROGRESS NOTES
[facility]                              Physical Therapy Daily Treatment Note    Patient: Shayna Abreu   : 1975  Diagnosis/ICD-10 Code:  Acute postoperative pain of right shoulder [G89.18, M25.511]  Referring practitioner: Dulce Sotelo MD  Date of Initial Visit: Type: THERAPY  Noted: 2025  Today's Date: 4/3/2025  Patient seen for 22 sessions           Subjective   Shayna Abreu reports: still having that sharp pain through the front of her shoulder and down her arm from time to time depending on what she is doing. Pt has been doing the stretches Dmitriy, PT showed her last session.       Objective     See Exercise, Manual, and Modality Logs for complete treatment.     Assessment/Plan  Shayna progressing as evident by decreased overall right shoulder pain. Pt tolerated exercises well, with some discomfort during chest press. Patient will continue to benefit from skilled physical therapy services to further address pain management,  their deficits in strength in order to improve upon their functional mobility for any ADL concerns.      Progress per Plan of Care         Timed:  Manual Therapy:         mins  11106;  Therapeutic Exercise:    10     mins  59930;     Neuromuscular Concha:    8    mins  39426;    Therapeutic Activity:     10     mins  19184;     Gait Training:           mins  64146;        Untimed:  Electrical Stimulation:         mins  28502 ( );  Mechanical Traction:         mins  64311;       Timed Treatment:   28   mins   Total Treatment:     28   mins      Electronically signed:     Avis Irby PTA  Physical Therapist Assistant  [facility] License #: [id number]

## 2025-04-07 ENCOUNTER — TREATMENT (OUTPATIENT)
Dept: PHYSICAL THERAPY | Facility: CLINIC | Age: 50
End: 2025-04-07
Payer: OTHER MISCELLANEOUS

## 2025-04-07 DIAGNOSIS — M25.511 ACUTE POSTOPERATIVE PAIN OF RIGHT SHOULDER: Primary | ICD-10-CM

## 2025-04-07 DIAGNOSIS — Z98.890 S/P RIGHT ROTATOR CUFF REPAIR: ICD-10-CM

## 2025-04-07 DIAGNOSIS — G89.18 ACUTE POSTOPERATIVE PAIN OF RIGHT SHOULDER: Primary | ICD-10-CM

## 2025-04-07 DIAGNOSIS — R29.898 WEAKNESS OF RIGHT SHOULDER: ICD-10-CM

## 2025-04-07 PROCEDURE — 97530 THERAPEUTIC ACTIVITIES: CPT

## 2025-04-07 PROCEDURE — 97140 MANUAL THERAPY 1/> REGIONS: CPT

## 2025-04-07 PROCEDURE — 97110 THERAPEUTIC EXERCISES: CPT

## 2025-04-10 ENCOUNTER — OFFICE VISIT (OUTPATIENT)
Dept: ORTHOPEDIC SURGERY | Facility: CLINIC | Age: 50
End: 2025-04-10
Payer: OTHER MISCELLANEOUS

## 2025-04-10 ENCOUNTER — TREATMENT (OUTPATIENT)
Dept: PHYSICAL THERAPY | Facility: CLINIC | Age: 50
End: 2025-04-10
Payer: OTHER MISCELLANEOUS

## 2025-04-10 VITALS
SYSTOLIC BLOOD PRESSURE: 124 MMHG | OXYGEN SATURATION: 94 % | WEIGHT: 282.19 LBS | HEIGHT: 68 IN | HEART RATE: 65 BPM | BODY MASS INDEX: 42.77 KG/M2 | DIASTOLIC BLOOD PRESSURE: 80 MMHG

## 2025-04-10 DIAGNOSIS — M25.511 RIGHT SHOULDER PAIN, UNSPECIFIED CHRONICITY: ICD-10-CM

## 2025-04-10 DIAGNOSIS — G89.18 ACUTE POSTOPERATIVE PAIN OF RIGHT SHOULDER: Primary | ICD-10-CM

## 2025-04-10 DIAGNOSIS — R29.898 WEAKNESS OF RIGHT SHOULDER: ICD-10-CM

## 2025-04-10 DIAGNOSIS — M25.511 ACUTE POSTOPERATIVE PAIN OF RIGHT SHOULDER: Primary | ICD-10-CM

## 2025-04-10 DIAGNOSIS — Z98.890 S/P ARTHROSCOPY OF RIGHT SHOULDER: Primary | ICD-10-CM

## 2025-04-10 DIAGNOSIS — R20.2 RIGHT HAND PARESTHESIA: ICD-10-CM

## 2025-04-10 DIAGNOSIS — Z98.890 S/P RIGHT ROTATOR CUFF REPAIR: ICD-10-CM

## 2025-04-10 RX ORDER — LIDOCAINE HYDROCHLORIDE 10 MG/ML
5 INJECTION, SOLUTION INFILTRATION; PERINEURAL
Status: COMPLETED | OUTPATIENT
Start: 2025-04-10 | End: 2025-04-10

## 2025-04-10 RX ORDER — TRIAMCINOLONE ACETONIDE 40 MG/ML
40 INJECTION, SUSPENSION INTRA-ARTICULAR; INTRAMUSCULAR
Status: COMPLETED | OUTPATIENT
Start: 2025-04-10 | End: 2025-04-10

## 2025-04-10 RX ORDER — DICLOFENAC SODIUM 75 MG/1
75 TABLET, DELAYED RELEASE ORAL 2 TIMES DAILY
Qty: 30 TABLET | Refills: 0 | Status: SHIPPED | OUTPATIENT
Start: 2025-04-10

## 2025-04-10 RX ADMIN — LIDOCAINE HYDROCHLORIDE 5 ML: 10 INJECTION, SOLUTION INFILTRATION; PERINEURAL at 09:29

## 2025-04-10 RX ADMIN — TRIAMCINOLONE ACETONIDE 40 MG: 40 INJECTION, SUSPENSION INTRA-ARTICULAR; INTRAMUSCULAR at 09:29

## 2025-04-10 NOTE — PROGRESS NOTES
"Chief Complaint  Pain and Follow-up of the Right Shoulder    Subjective      Shayna Abreu presents to [facility]     History of Present Illness  The patient is here for a follow-up on her right shoulder. She is officially 12 weeks status post right shoulder arthroscopy with rotator cuff repair, subacromial decompression, and distal clavicular excision. She was last seen in the office on 03/27/2025 and was doing well, with a follow-up scheduled for 3 months later. However, she returns to the clinic today early, reporting significant pain in the front area of the shoulder after starting a new set of exercises. She has been seen for 4 visits since then for this right shoulder pain, with continued discomfort. She was seen at physical therapy this morning. This is a worker's compensation case.    She reports experiencing a pulling sensation in her arm during weightlifting exercises, which she attributes to the addition of weights. She also notes swelling in her shoulder and describes a tingling sensation that radiates down her arm. Despite these symptoms, she maintains a good range of motion. Her physical therapy sessions have recently shifted focus from weightlifting to stretching exercises. She has been using a TENS unit for pain management and is considering resuming its use. She has been managing her pain with meloxicam but is contemplating a switch to an alternative medication. She has previously used diclofenac but discontinued it due to gastrointestinal upset.    MEDICATIONS  Current: Meloxicam.  Past: Diclofenac.      Allergies   Allergen Reactions    Penicillins Hives     AS A CHILD       Objective     Vital Signs:   Vitals:    04/10/25 0907   BP: 124/80   Pulse: 65   SpO2: 94%   Weight: 128 kg (282 lb 3 oz)   Height: 172.7 cm (68\")     Body mass index is 42.91 kg/m².    I reviewed the patient's chief complaint, history of present illness, review of systems, past medical history, " surgical history, family history, social history, medications, and allergy list.     Ortho Exam  Shoulder   General: Alert. No acute distress.  Right Upper Extremity: tender to palpation over distal deltoid. No skin discoloration, atrophy, or swelling. 170 degrees active elevation. 120 active shoulder abduction. External rotation to 45 degrees. Internal rotation to side pocket. Demonstrates intact active elbow ROM. Demonstrates intact active wrist ROM. Sensation intact. Palpable radial pulse. Neurovascularly intact.      Large Joint Arthrocentesis: R subacromial bursa  Date/Time: 4/10/2025 9:29 AM  Consent given by: patient  Site marked: site marked  Timeout: Immediately prior to procedure a time out was called to verify the correct patient, procedure, equipment, support staff and site/side marked as required   Supporting Documentation  Indications: pain   Procedure Details  Location: shoulder - R subacromial bursa  Preparation: Patient was prepped and draped in the usual sterile fashion  Needle gauge: 21 G.  Approach: posterior  Medications administered: 5 mL lidocaine 1 %; 40 mg triamcinolone acetonide 40 MG/ML  Patient tolerance: patient tolerated the procedure well with no immediate complications       This injection documentation was Scribed for SUKUMAR Trevizo by Saniya Trinh.  04/10/25   09:30 EDT      Imaging Results (Most Recent)       None              Assessment and Plan   Diagnoses and all orders for this visit:    1. S/P arthroscopy of right shoulder with RCRAKASH DC (Primary)  -     Ambulatory Referral to Occupational Therapy for Evaluation & Treatment  -     diclofenac (VOLTAREN) 75 MG EC tablet; Take 1 tablet by mouth 2 (Two) Times a Day.  Dispense: 30 tablet; Refill: 0    2. Right shoulder pain, unspecified chronicity  -     Ambulatory Referral to Occupational Therapy for Evaluation & Treatment  -     diclofenac (VOLTAREN) 75 MG EC tablet; Take 1 tablet by mouth 2 (Two) Times a Day.   Dispense: 30 tablet; Refill: 0  -     EMG & Nerve Conduction Test; Future    3. Right hand paresthesia  -     EMG & Nerve Conduction Test; Future    Other orders  -     Large Joint Arthrocentesis: R subacromial bursa      Assessment & Plan  1. Post-operative status following right shoulder arthroscopy.  She is currently 12 weeks post-procedure, which included rotator cuff repair, subacromial decompression, and distal clavicular excision. She reports significant pain in the front area of the shoulder after starting a new set of exercises. There are no concerns about the rotator cuff based on her motion, but of course the only way we could confirm would be an MRI. We will try to treat conservatively with a change to diclofenac, using the TENS unit, and a right injection today. The pain may be due to muscle reaction and tightness as she begins strengthening exercises. A prescription for diclofenac, to be taken twice daily for 2 weeks, will be provided. She is advised to resume meloxicam after this period.     Patient elected to proceed with repeat right shoulder steroid injections. Patient is previously aware of the risks, benefits and alternatives of injections. Patient was informed of possible adverse effects including but not limited to bleeding, damage to nerve, tendon or artery, increased blood sugar and increased blood pressure. Discussed possibility of a reaction from the injection.  Discussed the possibility that the injection may not completely improve or remove the pain.  Discussed the risk of infection.  Discussed the possibility of worsening pain after the injection.  Informed consent obtained.  Time out was performed. Chlorhexidine was swabbed at injection site per typical technique. Needle injected into bursa, aspiration was performed and then right shoulder steroid slowly injected into joint space, fluid was free flowing. Needle was removed, band-aid placed to injection site.  Patient tolerated injection  well with no complications. Additional information was provided on at checkout.     She is encouraged to continue with her stretching exercises and avoid weightlifting for now. A new order for physical therapy will be placed.    2.  Paresthesia of the right hand  Since the initial injury patient has experienced intermittent paresthesia of the right 3rd, 4th and 5th digit on the right hand.  She does not report numbness but reports a constant sense of tingling.  She states that the symptoms have slightly worsened with this new onset of right shoulder pain.  She states that the symptoms have never resolved since the beginning of right after the injury.  We discussed that the best way to evaluate this would be to obtain an EMG of the right upper extremity to ensure no nerve damage.  Discussed that it is most likely temporary but since she has not had resolution in her symptoms we will go through further diagnostic evaluation.  EMG will be ordered today.    PROCEDURE  The patient underwent right shoulder arthroscopy with rotator cuff repair, subacromial decompression, and distal clavicular excision 12 weeks ago.      Follow-up at previously scheduled appointment.  Will discuss EMG results when they become available.  We did discuss the possibility of repeating a right shoulder MRI if symptoms do not improve or were to worsen over the next several weeks following today's visit.  She will contact us with any further concerns.        Tobacco Use: Medium Risk (4/10/2025)    Patient History     Smoking Tobacco Use: Former     Smokeless Tobacco Use: Never     Passive Exposure: Past     Patient reports they have a history of tobacco use; encouraged continued tobacco cessation for further health benefits.            Follow Up   Return for Keep previously scheduled follow up appointment.  There are no Patient Instructions on file for this visit.    Patient was given instructions and counseling regarding her condition or for  health maintenance advice. Please see specific information pulled into the AVS if appropriate.     Patient or patient representative verbalized consent for the use of Ambient Listening during the visit with  SUKUMAR Trevizo for chart documentation. 4/10/2025  09:52 EDT    Dictated Utilizing Dragon Dictation. Please note that portions of this note were completed with a voice recognition program. Part of this note may be an electronic transcription/translation of spoken language to printed text using the Dragon Dictation System.

## 2025-04-10 NOTE — PROGRESS NOTES
[facility]                              Physical Therapy Daily Treatment Note    Patient: Shayna Abreu   : 1975  Diagnosis/ICD-10 Code:  Acute postoperative pain of right shoulder [G89.18, M25.511]  Referring practitioner: Dulce Sotelo MD  Date of Initial Visit: Type: THERAPY  Noted: 2025  Today's Date: 4/10/2025  Patient seen for 24 sessions           Subjective   Shayna Abreu reports: she has her follow up with ortho right after her PT appt today to discuss new radiating pain she has been experiencing in her right shoulder.      Objective     See Exercise, Manual, and Modality Logs for complete treatment.     Assessment/Plan  Patient tolerated exercises and stretches today with no complaints of increased radiating pain. Will continue to benefit from skilled PT services in order to address ROM and pain management to improve upon functional daily activities.    Progress per Plan of Care         Timed:  Manual Therapy:         mins  83034;  Therapeutic Exercise:    8     mins  81144;     Neuromuscular Concha:    8    mins  80285;    Therapeutic Activity:     12     mins  58562;     Gait Training:           mins  25975;        Untimed:  Electrical Stimulation:         mins  04064 ( );  Mechanical Traction:         mins  39868;       Timed Treatment:   28   mins   Total Treatment:     28   mins      Electronically signed:     Avis Irby PTA  Physical Therapist Assistant  [facility] License #: [id number]

## 2025-04-14 DIAGNOSIS — Z98.890 S/P ARTHROSCOPY OF RIGHT SHOULDER: Primary | ICD-10-CM

## 2025-04-14 DIAGNOSIS — M25.511 RIGHT SHOULDER PAIN, UNSPECIFIED CHRONICITY: ICD-10-CM

## 2025-04-15 ENCOUNTER — TRANSCRIBE ORDERS (OUTPATIENT)
Dept: ADMINISTRATIVE | Facility: HOSPITAL | Age: 50
End: 2025-04-15
Payer: COMMERCIAL

## 2025-04-15 DIAGNOSIS — Z12.31 BREAST CANCER SCREENING BY MAMMOGRAM: Primary | ICD-10-CM

## 2025-04-17 ENCOUNTER — TREATMENT (OUTPATIENT)
Dept: PHYSICAL THERAPY | Facility: CLINIC | Age: 50
End: 2025-04-17
Payer: COMMERCIAL

## 2025-04-17 DIAGNOSIS — M25.511 ACUTE POSTOPERATIVE PAIN OF RIGHT SHOULDER: Primary | ICD-10-CM

## 2025-04-17 DIAGNOSIS — G89.18 ACUTE POSTOPERATIVE PAIN OF RIGHT SHOULDER: Primary | ICD-10-CM

## 2025-04-17 DIAGNOSIS — Z98.890 S/P RIGHT ROTATOR CUFF REPAIR: ICD-10-CM

## 2025-04-17 DIAGNOSIS — R29.898 WEAKNESS OF RIGHT SHOULDER: ICD-10-CM

## 2025-04-23 ENCOUNTER — HOSPITAL ENCOUNTER (OUTPATIENT)
Facility: HOSPITAL | Age: 50
Discharge: HOME | End: 2025-04-23
Payer: OTHER MISCELLANEOUS

## 2025-04-23 DIAGNOSIS — M25.511 RIGHT SHOULDER PAIN, UNSPECIFIED CHRONICITY: ICD-10-CM

## 2025-04-23 DIAGNOSIS — R20.2 RIGHT HAND PARESTHESIA: ICD-10-CM

## 2025-04-23 PROCEDURE — 95886 MUSC TEST DONE W/N TEST COMP: CPT

## 2025-04-23 PROCEDURE — 95909 NRV CNDJ TST 5-6 STUDIES: CPT

## 2025-04-25 ENCOUNTER — TREATMENT (OUTPATIENT)
Dept: PHYSICAL THERAPY | Facility: CLINIC | Age: 50
End: 2025-04-25
Payer: OTHER MISCELLANEOUS

## 2025-04-25 DIAGNOSIS — Z98.890 S/P RIGHT ROTATOR CUFF REPAIR: ICD-10-CM

## 2025-04-25 DIAGNOSIS — M25.511 ACUTE POSTOPERATIVE PAIN OF RIGHT SHOULDER: Primary | ICD-10-CM

## 2025-04-25 DIAGNOSIS — G89.18 ACUTE POSTOPERATIVE PAIN OF RIGHT SHOULDER: Primary | ICD-10-CM

## 2025-04-25 NOTE — PROGRESS NOTES
Progress Note      Patient: Shayna Abreu   : 1975  Diagnosis/ICD-10 Code:  Acute postoperative pain of right shoulder [G89.18, M25.511]  Referring practitioner: Dulce Sotelo MD  Date of Initial Visit: Type: THERAPY  Noted: 2025  Today's Date: 2025  Patient seen for 26 sessions      Subjective:   Subjective Questionnaire: QuickDASH: 19  Clinical Progress: improved  Home Program Compliance: Yes  Treatment has included: therapeutic exercise, neuromuscular re-education, manual therapy, and therapeutic activity    Subjective   Patient reports after going through her diclofenac she had some improvement but she is still having near constant tingling her her right arm. She has had less pulling and pain in the shoulder but has been not doing as much with the arm as well.    Objective          Active Range of Motion     Right Shoulder   Flexion: 161 degrees   Abduction: 165 degrees   External rotation 0°: 75 degrees   External rotation BTH: WFL  Internal rotation BTB: WFL    Passive Range of Motion     Right Shoulder   Flexion: WFL  Abduction: WFL  External rotation 0°: WFL  Internal rotation 90°: WFL    Strength/Myotome Testing     Right Shoulder     Planes of Motion   Flexion: 4   Abduction: 4+   External rotation at 0°: 4+   Internal rotation at 0°: 4+     Isolated Muscles   Biceps: 5       See Exercise, Manual, and Modality Logs for complete treatment.     Assessment/Plan  Patient has progressed well since beginning skilled care. She is having some improvement shown in strength and her ROM is within normal limits. She is still limited in functional capacity for ADLs and work by weakness, pain, and neural symptoms in the operative shoulder. She has undergone EMG testing which has proved negative, and she unfortunately has seen minimal results with nerve glides performed in clinic and in HEP. Neural symptoms elicited by biceps loading, plan to continue to work biceps strengthening and shoulder  stability to tolerance to attempt to decrease pain and neural symptoms. Imaging may be appropriate.They are still limited in strength, ROM and by increased pain which limit their ability to perform overhead tasks, lifting tasks, work tasks, home care, and ADLs. Further skilled care indicated at this time to address remaining deficits.     Progress toward previous goals: Partially Met   1. The patient has limited ROM of the right shoulder.    LTG 1: 12 weeks:  The patient will demonstrate 165 degrees of right shoulder flexion, 165 degrees of shoulder abduction, 80 degrees of shoulder external rotation, and 80 degrees of shoulder internal rotation to allow the patient to reach into upper kitchen cabinets and manipulate clothing behind the back with greater ease.    STATUS: met     STG 1a: 6 weeks:  The patient will demonstrate 125 degrees of right shoulder flexion, 125 degrees of shoulder abduction, 70 degrees of shoulder external rotation, and 70 degrees of shoulder internal rotation.    STATUS:  met      2. The patient has limited strength of the right shoulder.   LTG 2: 12 weeks:  The patient will demonstrate 4+/5 strength for right shoulder flexion, abduction, external rotation, and internal rotation in order to demonstrate improved shoulder stability.    STATUS:  progressing     STG 2a: 8 weeks:  The patient will demonstrate 3+/5 strength for right shoulder flexion, abduction, external rotation, and internal rotation.    STATUS:  met   STG2b:  2 weeks:  The patient will be independent with home exercises.     STATUS:  met     3. The patient complains of pain to the right shoulder.   LTG 3: 12 weeks:  The patient will report a pain rating of 1/10 or better in order to improve sleep quality and tolerance to performance of activities of daily living.    STATUS:  progressing     STG 3a: 8 weeks:  The patient will report a pain rating of 3/10 or better.     STATUS:  met    4. Carrying, Moving, and Handling Objects  Functional Limitation     LTG 4: 12 weeks:  The patient will demonstrate 9.09 % limitation by achieving a score of 15/55 on the QuickDASH.    STATUS:  progressing     STG 4a: 6 weeks:  The patient will demonstrate 50 % limitation by achieving a score of 33/55 on the QuickDASH.      STATUS:  met   Recommendations: Continue as planned  Timeframe: 1 month-extending, focus on strengthening/stabilization  Prognosis to achieve goals: good    PT Signature: Dmitriy Rubalcava, PT    Electronically signed 2025    [place] License: PT - [id number]       Timed:  Manual Therapy:    0     mins  53084;  Therapeutic Exercise:    14     mins  13246;     Neuromuscular Concha:    10    mins  06433;    Therapeutic Activity:     0     mins  54289;     Gait Trainin     mins  69028;     Aquatics                         0      mins  25077    Un-timed:  Mechanical Traction      0     mins  65739  Dry Needling     0     mins self-pay  Electrical Stimulation:    0     mins  39223 ( );  6 minutes re-eval 94667  Timed Treatment:   24   mins   Total Treatment:     30   mins

## 2025-04-29 ENCOUNTER — TREATMENT (OUTPATIENT)
Dept: PHYSICAL THERAPY | Facility: CLINIC | Age: 50
End: 2025-04-29
Payer: OTHER MISCELLANEOUS

## 2025-04-29 DIAGNOSIS — M25.511 ACUTE POSTOPERATIVE PAIN OF RIGHT SHOULDER: ICD-10-CM

## 2025-04-29 DIAGNOSIS — R29.898 WEAKNESS OF RIGHT SHOULDER: ICD-10-CM

## 2025-04-29 DIAGNOSIS — Z98.890 S/P RIGHT ROTATOR CUFF REPAIR: Primary | ICD-10-CM

## 2025-04-29 DIAGNOSIS — G89.18 ACUTE POSTOPERATIVE PAIN OF RIGHT SHOULDER: ICD-10-CM

## 2025-04-29 NOTE — PROGRESS NOTES
"                                           [facility]                              Physical Therapy Daily Treatment Note    Patient: Shayna Abreu   : 1975  Diagnosis/ICD-10 Code:  S/P right rotator cuff repair [Z98.890]  Referring practitioner: Dulce Sotelo MD  Date of Initial Visit: Type: THERAPY  Noted: 2025  Today's Date: 2025  Patient seen for 27 sessions           Subjective   Shayna Abreu reports: no new complaints at time of therapy, besides the normal \"tingles\" in her fingers.      Objective     See Exercise, Manual, and Modality Logs for complete treatment.     Assessment/Plan  Shayna progressing as evident by decreased overall right shoulder pain, but continues to having radiating pain down into her fingers.Pt tolerated exercises well, with no complaints of increased pain or discomfort. Patient will continue to benefit from skilled physical therapy services to further address pain management,and   their deficits in strength in order to improve upon their functional mobility for any ADL concerns.      Progress per Plan of Care         Timed:  Manual Therapy:         mins  08493;  Therapeutic Exercise:    10     mins  08091;     Neuromuscular Concha:    8    mins  29783;    Therapeutic Activity:     8     mins  11050;     Gait Training:           mins  98021;        Untimed:  Electrical Stimulation:         mins  16484 ( );  Mechanical Traction:         mins  43192;       Timed Treatment:   26   mins   Total Treatment:     26   mins      Electronically signed:     Avis Irby PTA  Physical Therapist Assistant  [facility] License #: [id number]  "

## 2025-05-02 ENCOUNTER — TREATMENT (OUTPATIENT)
Dept: PHYSICAL THERAPY | Facility: CLINIC | Age: 50
End: 2025-05-02
Payer: OTHER MISCELLANEOUS

## 2025-05-02 DIAGNOSIS — G89.18 ACUTE POSTOPERATIVE PAIN OF RIGHT SHOULDER: Primary | ICD-10-CM

## 2025-05-02 DIAGNOSIS — R29.898 WEAKNESS OF RIGHT SHOULDER: ICD-10-CM

## 2025-05-02 DIAGNOSIS — M25.511 ACUTE POSTOPERATIVE PAIN OF RIGHT SHOULDER: Primary | ICD-10-CM

## 2025-05-02 DIAGNOSIS — Z98.890 S/P RIGHT ROTATOR CUFF REPAIR: ICD-10-CM

## 2025-05-02 NOTE — PROGRESS NOTES
Physical Therapy Daily Treatment Note                          Patient: Shayna Abreu   : 1975  Diagnosis/ICD-10 Code:  Acute postoperative pain of right shoulder [G89.18, M25.511]  Referring practitioner: Dulce Sotelo MD  Date of Initial Visit: Type: THERAPY  Noted: 2025  Today's Date: 2025  Patient seen for 28 sessions           Subjective   The patient reported no change to tingling and numbness as of yet. She denies shoulder pain today.    Objective   See Exercise, Manual, and Modality Logs for complete treatment.     Assessment/Plan     Patient tolerated today's treatment without complaints of pain. Able to perform strengthening with focus on RTC and biceps without increased pain. Numbness persists, she may benefit from MRI at this point. Strength, ROM, and increased pain with activity deficits still limits patient's ability to perform ADLs. Further skilled care indicated at this time.       Timed:  Manual Therapy:    0     mins  00808;  Therapeutic Exercise:    24     mins  79362;     Neuromuscular Concha:   0    mins  95773;    Therapeutic Activity:     8     mins  07606;     Gait Trainin     mins  24250;     Aquatics                         0      mins  00161    Un-timed:  Mechanical Traction      0     mins  09988  Dry Needling     0     mins self-pay  Electrical Stimulation:    0     mins  45227 ( );      Timed Treatment:   32   mins   Total Treatment:     32   mins    Dmitriy Rubalcava PT  Physical Therapist    Electronically signed 2025    [place] License: PT - [id number]

## 2025-05-05 ENCOUNTER — TREATMENT (OUTPATIENT)
Dept: PHYSICAL THERAPY | Facility: CLINIC | Age: 50
End: 2025-05-05
Payer: OTHER MISCELLANEOUS

## 2025-05-05 DIAGNOSIS — R29.898 WEAKNESS OF RIGHT SHOULDER: ICD-10-CM

## 2025-05-05 DIAGNOSIS — M25.511 ACUTE POSTOPERATIVE PAIN OF RIGHT SHOULDER: Primary | ICD-10-CM

## 2025-05-05 DIAGNOSIS — G89.18 ACUTE POSTOPERATIVE PAIN OF RIGHT SHOULDER: Primary | ICD-10-CM

## 2025-05-05 DIAGNOSIS — Z98.890 S/P RIGHT ROTATOR CUFF REPAIR: ICD-10-CM

## 2025-05-05 PROCEDURE — 97530 THERAPEUTIC ACTIVITIES: CPT

## 2025-05-05 PROCEDURE — 97112 NEUROMUSCULAR REEDUCATION: CPT

## 2025-05-05 PROCEDURE — 97110 THERAPEUTIC EXERCISES: CPT

## 2025-05-05 NOTE — PROGRESS NOTES
Physical Therapy Daily Treatment Note                          Patient: Shayna Abreu   : 1975  Diagnosis/ICD-10 Code:  Acute postoperative pain of right shoulder [G89.18, M25.511]  Referring practitioner: Dulce Sotelo MD  Date of Initial Visit: Type: THERAPY  Noted: 2025  Today's Date: 2025  Patient seen for 29 sessions           Subjective   The patient reported usual levels of numbness and that the pain still comes and goes.     Objective   See Exercise, Manual, and Modality Logs for complete treatment.     Assessment/Plan     Patient tolerated today's treatment without complaints of pain. Continued gradual strengthening progression per tolerance. Strength, ROM, and increased pain with activity deficits still limits patient's ability to perform ADLs and work tasks. Further skilled care indicated at this time.       Timed:  Manual Therapy:    0     mins  09602;  Therapeutic Exercise:    10     mins  03542;     Neuromuscular Concha:   10    mins  28085;    Therapeutic Activity:     10     mins  75595;     Gait Trainin     mins  89477;     Aquatics                         0      mins  00400    Un-timed:  Mechanical Traction      0     mins  86871  Dry Needling     0     mins self-pay  Electrical Stimulation:    0     mins  09969 ( );      Timed Treatment:   30   mins   Total Treatment:     30   mins    Dmitriy Rubalcava PT  Physical Therapist    Electronically signed 2025    [place] License: PT - [id number]

## 2025-05-06 ENCOUNTER — PATIENT MESSAGE (OUTPATIENT)
Dept: ORTHOPEDIC SURGERY | Facility: CLINIC | Age: 50
End: 2025-05-06
Payer: COMMERCIAL

## 2025-05-06 DIAGNOSIS — M25.511 RIGHT SHOULDER PAIN, UNSPECIFIED CHRONICITY: ICD-10-CM

## 2025-05-06 DIAGNOSIS — Z98.890 S/P ARTHROSCOPY OF RIGHT SHOULDER: ICD-10-CM

## 2025-05-06 RX ORDER — DICLOFENAC SODIUM 75 MG/1
75 TABLET, DELAYED RELEASE ORAL 2 TIMES DAILY
Qty: 60 TABLET | Refills: 1 | Status: SHIPPED | OUTPATIENT
Start: 2025-05-06

## 2025-05-08 ENCOUNTER — TREATMENT (OUTPATIENT)
Dept: PHYSICAL THERAPY | Facility: CLINIC | Age: 50
End: 2025-05-08
Payer: OTHER MISCELLANEOUS

## 2025-05-08 DIAGNOSIS — Z98.890 S/P RIGHT ROTATOR CUFF REPAIR: Primary | ICD-10-CM

## 2025-05-08 DIAGNOSIS — Z98.890 S/P ARTHROSCOPY OF RIGHT SHOULDER: Primary | ICD-10-CM

## 2025-05-08 DIAGNOSIS — G89.18 ACUTE POSTOPERATIVE PAIN OF RIGHT SHOULDER: ICD-10-CM

## 2025-05-08 DIAGNOSIS — M25.511 ACUTE POSTOPERATIVE PAIN OF RIGHT SHOULDER: ICD-10-CM

## 2025-05-08 DIAGNOSIS — R29.898 WEAKNESS OF RIGHT SHOULDER: ICD-10-CM

## 2025-05-08 DIAGNOSIS — M25.511 RIGHT SHOULDER PAIN, UNSPECIFIED CHRONICITY: ICD-10-CM

## 2025-05-08 NOTE — PROGRESS NOTES
[facility] Outpatient Physical Therapy                              Physical Therapy Daily Treatment Note    Patient: Shayna Abreu   : 1975  Diagnosis/ICD-10 Code:  S/P right rotator cuff repair [Z98.890]  Referring practitioner: Dulce Sotelo MD  Date of Initial Visit: Type: THERAPY  Noted: 2025  Today's Date: 2025  Patient seen for 30 sessions           Subjective   Shayna Abreu reports: no new complaints at time of therapy. Pt states her ortho PA wants her to try dry needling in her biceps to see if it helps with the tignling and burning.      Objective     See Exercise, Manual, and Modality Logs for complete treatment.     Assessment/Plan  Patient tolerated strengthening exercises with minimal complaints of tingling in her fingers. Currently waiting for workers comp to approve patient for trial of dry needling then will get her scheduled for that. Will continue to progress patient per POC and patient tolerance in order to improve upon functional daily activities.     Progress per Plan of Care         Timed:  Manual Therapy:         mins  79887;  Therapeutic Exercise:    10     mins  59667;     Neuromuscular Concha:    8    mins  23777;    Therapeutic Activity:     8     mins  24775;     Gait Training:           mins  27979;        Untimed:  Electrical Stimulation:         mins  21767 ( );  Mechanical Traction:         mins  04381;       Timed Treatment:   26   mins   Total Treatment:     26   mins      Electronically signed:     Avis Irby PTA  Physical Therapist Assistant  [facility] License #: [id number]

## 2025-05-13 ENCOUNTER — TREATMENT (OUTPATIENT)
Dept: PHYSICAL THERAPY | Facility: CLINIC | Age: 50
End: 2025-05-13
Payer: OTHER MISCELLANEOUS

## 2025-05-13 DIAGNOSIS — G89.18 ACUTE POSTOPERATIVE PAIN OF RIGHT SHOULDER: Primary | ICD-10-CM

## 2025-05-13 DIAGNOSIS — M25.511 ACUTE POSTOPERATIVE PAIN OF RIGHT SHOULDER: Primary | ICD-10-CM

## 2025-05-13 DIAGNOSIS — Z98.890 S/P RIGHT ROTATOR CUFF REPAIR: ICD-10-CM

## 2025-05-13 DIAGNOSIS — R29.898 WEAKNESS OF RIGHT SHOULDER: ICD-10-CM

## 2025-05-13 NOTE — PROGRESS NOTES
Outpatient Physical Therapy  [address] [facility] [place], [place] [address]                            Physical Therapy Daily Treatment Note    Patient: Shayna Abreu   : 1975  Diagnosis/ICD-10 Code:  Acute postoperative pain of right shoulder [G89.18, M25.511]  Referring practitioner: Dulce Sotelo MD  Date of Initial Visit: Type: THERAPY  Noted: 2025  Today's Date: 2025  Patient seen for 31 sessions           Subjective   Shayna Abreu reports: that the pain isn't bad in the shoulder, it's the tingling in the shoulder after a workout that makes her feel nauseous.     Objective   Increased difficulty with lateral raises and single arm robbers.     See Exercise, Manual, and Modality Logs for complete treatment.     Assessment/Plan  Shayna progressing as evident by decreased overall R shoulder pain. Pt tolerated exercises well,  difficulty lateral strengthening . Pt would benefit from skilled PT to address Range of Motion  and Strength deficits, pain management and any concerns with ADLs.       Progress per Plan of Care         Timed:  Manual Therapy:         mins  32702;  Therapeutic Exercise:    10     mins  54837;     Neuromuscular Concha:    10    mins  11633;    Therapeutic Activity:     10     mins  18547;     Gait Training:           mins  31608;      Untimed:  Electrical Stimulation:         mins  41867 ( );  Mechanical Traction:         mins  28197;     Timed Treatment:   30   mins   Total Treatment:     30   mins      Electronically signed:     Ary Barfield PTA  Physical Therapist Assistant  [facility] License #: [id number]

## 2025-05-14 ENCOUNTER — HOSPITAL ENCOUNTER (OUTPATIENT)
Dept: MAMMOGRAPHY | Facility: HOSPITAL | Age: 50
Discharge: HOME | End: 2025-05-14
Admitting: NURSE PRACTITIONER
Payer: COMMERCIAL

## 2025-05-14 DIAGNOSIS — Z12.31 BREAST CANCER SCREENING BY MAMMOGRAM: ICD-10-CM

## 2025-05-14 PROCEDURE — 77067 SCR MAMMO BI INCL CAD: CPT

## 2025-05-14 PROCEDURE — 77063 BREAST TOMOSYNTHESIS BI: CPT

## 2025-05-15 ENCOUNTER — TREATMENT (OUTPATIENT)
Dept: PHYSICAL THERAPY | Facility: CLINIC | Age: 50
End: 2025-05-15
Payer: OTHER MISCELLANEOUS

## 2025-05-15 DIAGNOSIS — M25.511 ACUTE POSTOPERATIVE PAIN OF RIGHT SHOULDER: ICD-10-CM

## 2025-05-15 DIAGNOSIS — Z98.890 S/P RIGHT ROTATOR CUFF REPAIR: Primary | ICD-10-CM

## 2025-05-15 DIAGNOSIS — R29.898 WEAKNESS OF RIGHT SHOULDER: ICD-10-CM

## 2025-05-15 DIAGNOSIS — G89.18 ACUTE POSTOPERATIVE PAIN OF RIGHT SHOULDER: ICD-10-CM

## 2025-05-15 NOTE — PROGRESS NOTES
[facility]                              Physical Therapy Daily Treatment Note    Patient: Shayna Abreu   : 1975  Diagnosis/ICD-10 Code:  S/P right rotator cuff repair [Z98.890]  Referring practitioner: Dulce Soteol MD  Date of Initial Visit: Type: THERAPY  Noted: 2025  Today's Date: 5/15/2025  Patient seen for 32 sessions           Subjective   Shayna Abreu reports: no pain in her shoulder, just the tingles and burning she has through her bicep into her fingers, especially after PT.      Objective     See Exercise, Manual, and Modality Logs for complete treatment.     Assessment/Plan  Shayna progressing as evident by decreased overall right shoulder pain, but continues to have tingling and burning sensation down her bicep into her last three digits.  Patient will continue to benefit from skilled physical therapy services to further address pain management,  their deficits in strength, and range of motion in order to improve upon their functional mobility for any ADL concerns.'      Progress per Plan of Care         Timed:  Manual Therapy:         mins  92669;  Therapeutic Exercise:    10     mins  71700;     Neuromuscular Concha:    8    mins  95658;    Therapeutic Activity:     10     mins  22855;     Gait Training:           mins  65085;        Untimed:  Electrical Stimulation:         mins  14352 ( );  Mechanical Traction:         mins  44766;       Timed Treatment:   28   mins   Total Treatment:     28   mins      Electronically signed:     Avis Irby PTA  Physical Therapist Assistant  [facility] License #: [id number]

## 2025-05-20 ENCOUNTER — TREATMENT (OUTPATIENT)
Dept: PHYSICAL THERAPY | Facility: CLINIC | Age: 50
End: 2025-05-20
Payer: OTHER MISCELLANEOUS

## 2025-05-20 DIAGNOSIS — R29.898 WEAKNESS OF RIGHT SHOULDER: ICD-10-CM

## 2025-05-20 DIAGNOSIS — M25.511 ACUTE POSTOPERATIVE PAIN OF RIGHT SHOULDER: ICD-10-CM

## 2025-05-20 DIAGNOSIS — G89.18 ACUTE POSTOPERATIVE PAIN OF RIGHT SHOULDER: ICD-10-CM

## 2025-05-20 DIAGNOSIS — Z98.890 S/P RIGHT ROTATOR CUFF REPAIR: Primary | ICD-10-CM

## 2025-05-20 NOTE — PROGRESS NOTES
Physical Therapy Daily Treatment Note                      [place] [facility] [address] [place] [address]    Patient: Shayna Abreu   : 1975  Diagnosis/ICD-10 Code:  S/P right rotator cuff repair [Z98.890]  Referring practitioner: Dulce Sotelo MD  Date of Initial Visit: Type: THERAPY  Noted: 2025  Today's Date: 2025  Patient seen for 33 sessions           Subjective   The patient was educated on the risks/benefits of dry needling and afterwards provided both verbal and written consent to treatment.     Objective   See Exercise, Manual, and Modality Logs for complete treatment.     Assessment/Plan    The patient tolerated dry needling well with no immediate adverse effects. Treatment was effective in eliciting multiple local twitch responses at upper trapezius and middle deltoid. Continue to monitor for long term benefit.              Timed:  Manual Therapy:    0     mins  85117;  Therapeutic Exercise:    0     mins  25475;     Neuromuscular Concha:   0    mins  18435;    Therapeutic Activity:     0     mins  66230;     Gait Trainin     mins  08556;     Aquatics                         0      mins  30939    Un-timed:  Mechanical Traction      0     mins  23576  Dry Needling     15     mins self-pay  Electrical Stimulation:    0     mins  67772 ( );      Timed Treatment:   0   mins   Total Treatment:     15   mins    Rafael Francois PT  Physical Therapist    Electronically signed 2025    [place] License: PT - [id number]

## 2025-05-27 ENCOUNTER — TELEPHONE (OUTPATIENT)
Dept: GENERAL RADIOLOGY | Facility: HOSPITAL | Age: 50
End: 2025-05-27
Payer: COMMERCIAL

## 2025-05-27 NOTE — TELEPHONE ENCOUNTER
Patient mammography result letter was returned for reason not deliverable as addressed. Per the patient, the address on file is correct. The patient is aware of the results and does not wish to receive the letter.

## 2025-05-30 ENCOUNTER — TREATMENT (OUTPATIENT)
Dept: PHYSICAL THERAPY | Facility: CLINIC | Age: 50
End: 2025-05-30
Payer: OTHER MISCELLANEOUS

## 2025-05-30 DIAGNOSIS — M25.511 ACUTE POSTOPERATIVE PAIN OF RIGHT SHOULDER: Primary | ICD-10-CM

## 2025-05-30 DIAGNOSIS — G89.18 ACUTE POSTOPERATIVE PAIN OF RIGHT SHOULDER: Primary | ICD-10-CM

## 2025-05-30 DIAGNOSIS — R29.898 WEAKNESS OF RIGHT SHOULDER: ICD-10-CM

## 2025-05-30 DIAGNOSIS — Z98.890 S/P RIGHT ROTATOR CUFF REPAIR: ICD-10-CM

## 2025-05-30 PROCEDURE — 20561 NDL INSJ W/O NJX 3+ MUSC: CPT | Performed by: PHYSICAL THERAPIST

## 2025-05-30 PROCEDURE — 97164 PT RE-EVAL EST PLAN CARE: CPT | Performed by: PHYSICAL THERAPIST

## 2025-05-30 NOTE — PROGRESS NOTES
Re-Assessment / Re-Certification  [address] [place] [id number]      Patient: Shayna Abreu   : 1975  Diagnosis/ICD-10 Code:  Acute postoperative pain of right shoulder [G89.18, M25.511]  Referring practitioner: Dulce Sotelo MD  Date of Initial Visit: Type: THERAPY  Noted: 2025  Today's Date: 2025  Patient seen for 34 sessions      Subjective:   Subjective Questionnaire: QuickDASH: 14  Clinical Progress: improved  Home Program Compliance: Yes  Treatment has included: therapeutic exercise, manual therapy, dry needling    Subjective : Shayna Abreu reports: she had some minor relief from the dry needling last visit, willing to try it again. Just overall she is still having some pain.     Current Pain 4/10      Objective     Active Range of Motion      Right Shoulder   Flexion: 161 degrees   Abduction: 165 degrees   External rotation 0°: 75 degrees   External rotation BTH: WFL  Internal rotation BTB: WFL     Passive Range of Motion      Right Shoulder   Flexion: WFL  Abduction: WFL  External rotation 0°: WFL  Internal rotation 90°: WFL     Strength/Myotome Testing      Right Shoulder      Planes of Motion   Flexion: 4   Abduction: 4+   External rotation at 0°: 4+   Internal rotation at 0°: 4+      Isolated Muscles   Biceps: 5        See Exercise, Manual, and Modality Logs for complete treatment.     Assessment/Plan    Patient has progressed well since beginning skilled care. She is having some improvement shown in strength and her ROM is within normal limits. She is still limited in functional capacity for ADLs and work by weakness, pain, and neural symptoms in the operative shoulder. Performed dry needling again today, will continue to progress as indicated with dry needling if it is effective for pain relief. They are still limited in strength, ROM and by increased pain which limit their ability to perform overhead tasks, lifting tasks, work tasks, home care, and ADLs. Further  skilled care indicated at this time to address remaining deficits. Pt gives written and verbal consent with description of procedure and review of risks, benefits, precautions, contraindications, and potential side effects. Education provided for post needling soreness and expectations. All dry needling performed to appropriate depth with bony backdrop, or threading to avoid any anatomic structures that are contraindicated. No adverse events noted.     Progress toward previous goals: Partially Met   1. The patient has limited ROM of the right shoulder.                        LTG 1: 12 weeks:  The patient will demonstrate 165 degrees of right shoulder flexion, 165 degrees of shoulder abduction, 80 degrees of shoulder external rotation, and 80 degrees of shoulder internal rotation to allow the patient to reach into upper kitchen cabinets and manipulate clothing behind the back with greater ease.                          STATUS: met                 STG 1a: 6 weeks:  The patient will demonstrate 125 degrees of right shoulder flexion, 125 degrees of shoulder abduction, 70 degrees of shoulder external rotation, and 70 degrees of shoulder internal rotation.                          STATUS:  met        2. The patient has limited strength of the right shoulder.              LTG 2: 12 weeks:  The patient will demonstrate 4+/5 strength for right shoulder flexion, abduction, external rotation, and internal rotation in order to demonstrate improved shoulder stability.                          STATUS:  progressing                 STG 2a: 8 weeks:  The patient will demonstrate 3+/5 strength for right shoulder flexion, abduction, external rotation, and internal rotation.                          STATUS:  met              STG2b:  2 weeks:  The patient will be independent with home exercises.                           STATUS:  met                3. The patient complains of pain to the right shoulder.              LTG 3: 12 weeks:   The patient will report a pain rating of 1/10 or better in order to improve sleep quality and tolerance to performance of activities of daily living.                          STATUS:  progressing                 STG 3a: 8 weeks:  The patient will report a pain rating of 3/10 or better.                           STATUS:  met     4. Carrying, Moving, and Handling Objects Functional Limitation                               LTG 4: 12 weeks:  The patient will demonstrate 9.09 % limitation by achieving a score of 15/55 on the QuickDASH.                          STATUS:  progressing                 STG 4a: 6 weeks:  The patient will demonstrate 50 % limitation by achieving a score of 33/55 on the QuickDASH.                            STATUS:  met   Recommendations: Continue as planned  Timeframe: 1 month-extending, focus on strengthening/stabilization  Prognosis to achieve goals: good      Progress toward previous goals: Partially Met        Recommendations: Continue as planned  Timeframe: 3 months  Prognosis to achieve goals: good    PT Signature: Deric Barfield PT    Electronically signed 5/30/2025    [place] License: PT - [id number]     Based upon review of the patient's progress and continued therapy plan, it is my medical opinion that Shayna Abreu should continue physical therapy treatment at [facility] PHYSICAL THERAPY  [address] [facility]  [place] [address] [phone].    Signature: __________________________________  Dulce Sotelo MD  NPI: [id number]         90 Day Recertification  Certification Period: 5/30/2025 thru 8/27/2025  I certify that the therapy services are furnished while this patient is under my care.  The services outlined above are required by this patient, and will be reviewed every 90 days.      Please sign and return via fax to [phone]. Thank you, [facility].    Timed:  Manual Therapy:    0     mins  90887;  Therapeutic Exercise:    0     mins   42135;     Neuromuscular Concha:    0    mins  67788;    Therapeutic Activity:     0     mins  13976;     Gait Trainin     mins  97816;     Ultrasound:     0     mins  19504;    Aquatic Therapy    0     mins  43296    Untimed:  Electrical Stimulation:    0     mins  46460 ( );  Dry Needling     10     mins self-pay  Canalith Repos    0     mins 96693  Moist Heat     0     mins No charge      Timed Treatment:   10   mins   Total Treatment:     25   mins

## 2025-06-03 ENCOUNTER — TREATMENT (OUTPATIENT)
Dept: PHYSICAL THERAPY | Facility: CLINIC | Age: 50
End: 2025-06-03
Payer: OTHER MISCELLANEOUS

## 2025-06-03 DIAGNOSIS — G89.18 ACUTE POSTOPERATIVE PAIN OF RIGHT SHOULDER: Primary | ICD-10-CM

## 2025-06-03 DIAGNOSIS — R29.898 WEAKNESS OF RIGHT SHOULDER: ICD-10-CM

## 2025-06-03 DIAGNOSIS — Z98.890 S/P RIGHT ROTATOR CUFF REPAIR: ICD-10-CM

## 2025-06-03 DIAGNOSIS — M25.511 ACUTE POSTOPERATIVE PAIN OF RIGHT SHOULDER: Primary | ICD-10-CM

## 2025-06-03 NOTE — PROGRESS NOTES
Physical Therapy Daily Treatment Note  [address] [place] [address]    Patient: Shayna Abreu   : 1975  Diagnosis/ICD-10 Code:  Acute postoperative pain of right shoulder [G89.18, M25.511]  Referring practitioner: Dulce Sotelo MD  Date of Initial Visit: Type: THERAPY  Noted: 2025  Today's Date: 6/3/2025  Patient seen for 35 sessions           Subjective : Patient reports her pec was really sore after the last visit, didn't notice a lot of change overall, but she is willing to try again.    Objective   See Exercise, Manual, and Modality Logs for complete treatment.       Assessment/Plan : Pt tolerated today's session well. Pt gives written and verbal consent with description of procedure and review of risks, benefits, precautions, contraindications, and potential side effects. Education provided for post needling soreness and expectations. All dry needling performed to appropriate depth with bony backdrop, or threading to avoid any anatomic structures that are contraindicated. No adverse events noted. Used the e-stim unit today with the needles. Pt would benefit from continued skilled therapy to address deficits. Progress per plan of care.             Timed:  Manual Therapy:    10     mins  37862;  Therapeutic Exercise:    0     mins  30979;     Neuromuscular Concha:    0    mins  56249;    Therapeutic Activity:     0     mins  90116;     Gait Trainin     mins  93189;     Ultrasound:     0     mins  39284;    Aquatic Therapy    0     mins  95080;    Untimed:  Electrical Stimulation:    0     mins  73335 ( );  Dry Needling     12     mins self-pay;  Mechanical Traction    0     mins  39987  Moist Heat     0     mins  No charge  Canalith Repos              0     mins 86175    Timed Treatment:   10   mins   Total Treatment:     22   mins    Deric Barfield PT  Physical Therapist    Electronically signed 6/3/2025    [place] License: PT - [id number]

## 2025-06-06 ENCOUNTER — TREATMENT (OUTPATIENT)
Dept: PHYSICAL THERAPY | Facility: CLINIC | Age: 50
End: 2025-06-06
Payer: OTHER MISCELLANEOUS

## 2025-06-06 DIAGNOSIS — R29.898 WEAKNESS OF RIGHT SHOULDER: ICD-10-CM

## 2025-06-06 DIAGNOSIS — M25.511 ACUTE POSTOPERATIVE PAIN OF RIGHT SHOULDER: ICD-10-CM

## 2025-06-06 DIAGNOSIS — Z98.890 S/P RIGHT ROTATOR CUFF REPAIR: Primary | ICD-10-CM

## 2025-06-06 DIAGNOSIS — G89.18 ACUTE POSTOPERATIVE PAIN OF RIGHT SHOULDER: ICD-10-CM

## 2025-06-06 NOTE — PROGRESS NOTES
Physical Therapy Daily Treatment Note                      [place] [facility] [address] [place] [address]    Patient: Shayna Abreu   : 1975  Diagnosis/ICD-10 Code:  S/P right rotator cuff repair [Z98.890]  Referring practitioner: Dulce Sotelo MD  Date of Initial Visit: Type: THERAPY  Noted: 2025  Today's Date: 2025  Patient seen for 36 sessions           Subjective   The patient reported that her shoulder pain isn't too bad today. She rates her pain as a 3/10. She is agreeable to try dry needling again today.    Objective   See Exercise, Manual, and Modality Logs for complete treatment.     Assessment/Plan    The patient tolerated dry needling well with no immediate adverse effects. Treatment was effective in eliciting multiple local twitch responses at upper trapezius and middle deltoid. Continue to monitor for long term benefit.          Timed:  Manual Therapy:    0     mins  91454;  Therapeutic Exercise:    0     mins  21855;     Neuromuscular Concha:   0    mins  47662;    Therapeutic Activity:     0     mins  98814;     Gait Trainin     mins  87233;     Aquatics                         0      mins  15209    Un-timed:  Mechanical Traction      0     mins  83049  Dry Needling     15     mins (insurance to cover)  Electrical Stimulation:    0     mins  95382 ( );      Timed Treatment:   0   mins   Total Treatment:     15   mins    Rafael Francois PT  Physical Therapist    Electronically signed 2025    [place] License: PT - [id number]

## 2025-06-13 ENCOUNTER — TREATMENT (OUTPATIENT)
Dept: PHYSICAL THERAPY | Facility: CLINIC | Age: 50
End: 2025-06-13
Payer: OTHER MISCELLANEOUS

## 2025-06-13 DIAGNOSIS — Z98.890 S/P RIGHT ROTATOR CUFF REPAIR: Primary | ICD-10-CM

## 2025-06-13 DIAGNOSIS — R29.898 WEAKNESS OF RIGHT SHOULDER: ICD-10-CM

## 2025-06-13 DIAGNOSIS — M25.511 ACUTE POSTOPERATIVE PAIN OF RIGHT SHOULDER: ICD-10-CM

## 2025-06-13 DIAGNOSIS — G89.18 ACUTE POSTOPERATIVE PAIN OF RIGHT SHOULDER: ICD-10-CM

## 2025-06-13 NOTE — PROGRESS NOTES
Physical Therapy Daily Treatment Note                      [place] [facility] [address] [place] [address]    Patient: Shayna Abreu   : 1975  Diagnosis/ICD-10 Code:  S/P right rotator cuff repair [Z98.890]  Referring practitioner: Dulce Sotelo MD  Date of Initial Visit: Type: THERAPY  Noted: 2025  Today's Date: 2025  Patient seen for 37 sessions           Subjective   The patient reported that the tingling in her right hand seems to be better or she has gotten used to it. She only notices the tingling if she does too much activity.    Objective   See Exercise, Manual, and Modality Logs for complete treatment.     Assessment/Plan    The patient tolerated dry needling well with no immediate adverse effects. Treatment was effective in eliciting multiple local twitch responses at upper trapezius and middle deltoid. Continue to monitor for long term benefit.          Timed:  Manual Therapy:    0     mins  33182;  Therapeutic Exercise:    0     mins  75321;     Neuromuscular Concha:   0    mins  25793;    Therapeutic Activity:     0     mins  04476;     Gait Trainin     mins  57275;     Aquatics                         0      mins  54878    Un-timed:  Mechanical Traction      0     mins  97255  Dry Needling     15     mins self-pay  Electrical Stimulation:    0     mins  57226 ( );      Timed Treatment:   0   mins   Total Treatment:     15   mins    Rafael Francois PT  Physical Therapist    Electronically signed 2025    [place] License: PT - [id number]

## 2025-06-20 ENCOUNTER — TREATMENT (OUTPATIENT)
Dept: PHYSICAL THERAPY | Facility: CLINIC | Age: 50
End: 2025-06-20
Payer: OTHER MISCELLANEOUS

## 2025-06-20 DIAGNOSIS — M25.511 ACUTE POSTOPERATIVE PAIN OF RIGHT SHOULDER: ICD-10-CM

## 2025-06-20 DIAGNOSIS — G89.18 ACUTE POSTOPERATIVE PAIN OF RIGHT SHOULDER: ICD-10-CM

## 2025-06-20 DIAGNOSIS — Z98.890 S/P RIGHT ROTATOR CUFF REPAIR: Primary | ICD-10-CM

## 2025-06-20 DIAGNOSIS — R29.898 WEAKNESS OF RIGHT SHOULDER: ICD-10-CM

## 2025-06-20 NOTE — PROGRESS NOTES
Physical Therapy Daily Treatment Note                      [place] [facility] [address] [facility] [place] [address]    Patient: Shayna Abreu   : 1975  Diagnosis/ICD-10 Code:  S/P right rotator cuff repair [Z98.890]  Referring practitioner: Dulce Sotelo MD  Date of Initial Visit: Type: THERAPY  Noted: 2025  Today's Date: 2025  Patient seen for 38 sessions           Subjective   The patient reported that her shoulder pain isn't too bad today. She still feels it if she does too much, but has been able to help out some with repairs/cleaning on a rental property. The n/t into her right arm has improved some with DN, but is still intermittent. She hasn't noticed as much change over the past week.    Objective   See Exercise, Manual, and Modality Logs for complete treatment.     Assessment/Plan    The patient demonstrated good tolerance to dry needling with no immediate adverse effects. Treatment was successful in eliciting local twitch responses in right biceps and middle deltoid. She initially made some improvement with dry needling, but progress has plateaued in the last 1-2 weeks. Additional dry needling wouldn't be expected to yield additional improvement at this time. She follows back up with ortho next week. I wouldn't recommend additional PT at this time.          Timed:  Manual Therapy:    0     mins  91439;  Therapeutic Exercise:    0     mins  21227;     Neuromuscular Concha:   0    mins  06659;    Therapeutic Activity:     0     mins  90029;     Gait Trainin     mins  80238;     Aquatics                         0      mins  45354    Un-timed:  Mechanical Traction      0     mins  31094  Dry Needling     15     mins self-pay  Electrical Stimulation:    0     mins  37941 ( );      Timed Treatment:   0   mins   Total Treatment:     15   mins    Rafael Francois PT  Physical Therapist    Electronically signed 2025    [place] License: PT - [id number]

## 2025-06-26 ENCOUNTER — OFFICE VISIT (OUTPATIENT)
Dept: ORTHOPEDIC SURGERY | Facility: CLINIC | Age: 50
End: 2025-06-26
Payer: OTHER MISCELLANEOUS

## 2025-06-26 VITALS
SYSTOLIC BLOOD PRESSURE: 129 MMHG | WEIGHT: 282 LBS | OXYGEN SATURATION: 97 % | DIASTOLIC BLOOD PRESSURE: 83 MMHG | HEIGHT: 68 IN | HEART RATE: 71 BPM | BODY MASS INDEX: 42.74 KG/M2

## 2025-06-26 DIAGNOSIS — M25.511 RIGHT SHOULDER PAIN, UNSPECIFIED CHRONICITY: ICD-10-CM

## 2025-06-26 DIAGNOSIS — R20.2 RIGHT HAND PARESTHESIA: ICD-10-CM

## 2025-06-26 DIAGNOSIS — Z98.890 S/P ARTHROSCOPY OF RIGHT SHOULDER: Primary | ICD-10-CM

## 2025-06-26 RX ORDER — DICLOFENAC SODIUM 75 MG/1
75 TABLET, DELAYED RELEASE ORAL 2 TIMES DAILY
Qty: 180 TABLET | Refills: 0 | Status: SHIPPED | OUTPATIENT
Start: 2025-06-26

## 2025-06-26 NOTE — PROGRESS NOTES
Chief Complaint  Follow-up and Pain of the Right Shoulder    Subjective      Shayna Abreu presents to [facility]     History of Present Illness  The patient is here today for a follow-up on her right shoulder. She is 5-1/2 months post right shoulder arthroscopy with rotator cuff repair, subacromial decompression, and distal clavicular excision.    She was last seen in the office on 04/10/2025, at which time an EMG study was ordered due to persistent paresthesia in the third, fourth, and fifth digits of her right hand, accompanied by a constant tingling sensation. The severity of these symptoms had increased, prompting the EMG order. The results indicated a normal study of the right upper extremity. Following this, it was discussed with the physical therapy team at [facility] that she should trial dry needling to alleviate the right shoulder pain. She also received a right shoulder steroid injection on 04/10/2025.    She was last seen by physical therapy on 06/20/2025, at which time she reported being seen for 38 sessions. She reported that her shoulder pain is manageable today, but it worsens with excessive use. Despite this, she has been able to assist with some repairs and cleaning on her property. The numbness and tingling in her right arm have improved somewhat with the dry needling, but these symptoms remain intermittent. She has not noticed significant changes over the past week. Physical therapy documented that she demonstrated good tolerance to dry needling with no immediate adverse effects. The treatment was successful in eliciting local twitch responses in the right biceps and middle deltoid. Initially, she made some improvement with dry needling, but progress has plateaued in the last 1 to 2 weeks. It was concluded that additional dry needling would not be beneficial at this time.     She reports intermittent tingling in her right hand, which she  "occasionally forgets about due to its persistence. Her shoulder pain fluctuates, and she experiences a clicking sensation at the site of the previous injection. She has two remaining physical therapy sessions scheduled but does not feel the need to attend them. She finds it uncomfortable to wear a bra due to pressure and tenderness in the area. Reaching behind her, such as to retrieve her purse from the back seat of her car, causes discomfort.     Overall, her strength is satisfactory, provided she keeps her elbows close to her body. Lifting objects or assisting with tasks can cause tingling in her hand for several days. She reports no neck pain.    She has run out of refills for diclofenac and is seeking a prescription. She believes it is beneficial and does not cause any harm. Her kidney function has always been good, except for a severe kidney infection that required hospitalization for two weeks.      Allergies   Allergen Reactions    Penicillins Hives     AS A CHILD       Objective     Vital Signs:   Vitals:    06/26/25 0825   BP: 129/83   Pulse: 71   SpO2: 97%   Weight: 128 kg (282 lb)   Height: 172.7 cm (68\")     Body mass index is 42.88 kg/m².    I reviewed the patient's chief complaint, history of present illness, review of systems, past medical history, surgical history, family history, social history, medications, and allergy list.     Ortho Exam  Physical Exam  Cardiovascular: Palpable radial pulse    Musculoskeletal:  Right shoulder: Active forward elevation 180 degrees, active abduction 170 degrees, mild tenderness over the incision extending down into the biceps, no swelling  Right hand: Intermittent numbness and tingling in the third, fourth, and fifth digits, able to make a tight fist  Right forearm: Soft, nontender, full elbow extension and flexion  Proximal biceps insertion site: Mild tenderness to palpation            Imaging Results (Most Recent)       None             Results  Diagnostic " Testing   - EMG of the right upper extremity: Normal study         Assessment and Plan   Diagnoses and all orders for this visit:    1. S/P arthroscopy of right shoulder with RCR, SAD, DC (Primary)  -     diclofenac (VOLTAREN) 75 MG EC tablet; Take 1 tablet by mouth 2 (Two) Times a Day.  Dispense: 180 tablet; Refill: 0    2. Right hand paresthesia    3. Right shoulder pain, unspecified chronicity  -     diclofenac (VOLTAREN) 75 MG EC tablet; Take 1 tablet by mouth 2 (Two) Times a Day.  Dispense: 180 tablet; Refill: 0           Assessment & Plan  1. Post-operative status following right shoulder arthroscopy:  Currently 5-1/2 months post-procedure, which included rotator cuff repair, subacromial decompression, and distal clavicular excision. The clicking sensation experienced is likely due to scar tissue from the incision impacting the deltoid. Symptoms of discomfort are primarily due to a loss of strength in the deltoid muscle.    Continue home exercises and gradually increase activity level as tolerated. Diclofenac prescription will be provided. Avoid activities that may cause falls. Strength is expected to improve over time.    2. Right hand paresthesia:  Reports intermittent numbness and tingling in the third, fourth, and fifth digits of the right hand, which has shown some improvement with dry needling but remains intermittent. EMG study showed normal results, suggesting symptoms might be due to intermittent compression rather than constant compression.    Cervical MRI discussed as a potential next step to check for disc bulging in the neck, though worker's comp approval is uncertain. Continue monitoring symptoms and avoid activities that exacerbate tingling.    Follow-up: 09/2025 for reevaluation          Tobacco Use: Medium Risk (6/26/2025)    Patient History     Smoking Tobacco Use: Former     Smokeless Tobacco Use: Never     Passive Exposure: Past     Patient reports they have a history of tobacco use;  encouraged continued tobacco cessation for further health benefits.            Follow Up   Return in about 3 months (around 9/26/2025).  There are no Patient Instructions on file for this visit.    Patient was given instructions and counseling regarding her condition or for health maintenance advice. Please see specific information pulled into the AVS if appropriate.     Patient or patient representative verbalized consent for the use of Ambient Listening during the visit with  SUKUMAR Trevizo for chart documentation. 6/26/2025  13:06 EDT    Dictated Utilizing Dragon Dictation. Please note that portions of this note were completed with a voice recognition program. Part of this note may be an electronic transcription/translation of spoken language to printed text using the Dragon Dictation System.

## (undated) DEVICE — GLV SURG BIOGEL LTX PF 8 1/2

## (undated) DEVICE — DRSNG WND GZ CURAD OIL EMULSION 3X3IN STRL

## (undated) DEVICE — GLOVE,SURG,SENSICARE SLT,LF,PF,8.5: Brand: MEDLINE

## (undated) DEVICE — SOL IRRG H2O PL/BG 1000ML STRL

## (undated) DEVICE — SOL IRR NACL 0.9PCT BT 1000ML

## (undated) DEVICE — STERILE POLYISOPRENE POWDER-FREE SURGICAL GLOVES WITH EMOLLIENT COATING: Brand: PROTEXIS

## (undated) DEVICE — GLV SURG SENSICARE PI ORTHO SZ8 LF STRL

## (undated) DEVICE — INTENDED FOR TISSUE SEPARATION, AND OTHER PROCEDURES THAT REQUIRE A SHARP SURGICAL BLADE TO PUNCTURE OR CUT.: Brand: BARD-PARKER ® CARBON RIB-BACK BLADES

## (undated) DEVICE — SOL IRR NACL 0.9PCT 3000ML

## (undated) DEVICE — GOWN,REINFRCE,POLY,SIRUS,BREATH SLV,XXLG: Brand: MEDLINE

## (undated) DEVICE — APPL CHLORAPREP HI/LITE 26ML ORNG

## (undated) DEVICE — BUR BRL FORMLA 12FLUT 4MM

## (undated) DEVICE — SHOULDER P.A.D. III: Brand: DEROYAL

## (undated) DEVICE — 90-S CRUISE, SUCTION PROBE, NON-BENDABLE, MAX CUT LEVEL 1: Brand: SERFAS ENERGY

## (undated) DEVICE — LINER SURG CANSTR SXN S/RIGD 1500CC

## (undated) DEVICE — SUT VIC 2/0 CT1 36IN

## (undated) DEVICE — CONN JET HYDRA H20 AUXILIARY DISP

## (undated) DEVICE — SUT MNCRYL PLS ANTIB UD 3/0 PS2 27IN

## (undated) DEVICE — SINGLE-USE BIOPSY FORCEPS: Brand: RADIAL JAW 4

## (undated) DEVICE — SUT ETHLN 3-0 FS118IN 663H

## (undated) DEVICE — OSC BEACH CHAIR SHOULDER-LF: Brand: MEDLINE INDUSTRIES, INC.

## (undated) DEVICE — SOLIDIFIER LIQLOC PLS 1500CC BT

## (undated) DEVICE — BLD CUT FORMLA AGGR PLS 4.0MM

## (undated) DEVICE — PENCL SMOKE/EVAC MEGADYNE TELESCP 10FT

## (undated) DEVICE — TBG INFLOW/OUTFLOW DUALWAVE 1P/U

## (undated) DEVICE — GLOVE,SURG,SENSICARE SLT,LF,PF,7: Brand: MEDLINE

## (undated) DEVICE — BLCK/BITE BLOX WO/DENTL/RIM W/STRAP 54F

## (undated) DEVICE — ANTIBACTERIAL VIOLET BRAIDED (POLYGLACTIN 910), SYNTHETIC ABSORBABLE SURGICAL SUTURE: Brand: COATED VICRYL

## (undated) DEVICE — Device: Brand: DEFENDO AIR/WATER/SUCTION AND BIOPSY VALVE

## (undated) DEVICE — POSTN HD UNIV

## (undated) DEVICE — SLV SCD KN/LEN ADJ EXPRSS BLENDED MD 1P/U

## (undated) DEVICE — Device